# Patient Record
Sex: MALE | Race: BLACK OR AFRICAN AMERICAN | NOT HISPANIC OR LATINO | Employment: UNEMPLOYED | URBAN - METROPOLITAN AREA
[De-identification: names, ages, dates, MRNs, and addresses within clinical notes are randomized per-mention and may not be internally consistent; named-entity substitution may affect disease eponyms.]

---

## 2018-06-28 DIAGNOSIS — M24.562 CONTRACTURE OF LEFT KNEE: ICD-10-CM

## 2018-06-28 DIAGNOSIS — R53.1 WEAKNESS OF LEFT SIDE OF BODY: Primary | ICD-10-CM

## 2018-08-07 DIAGNOSIS — R53.1 WEAKNESS GENERALIZED: Primary | ICD-10-CM

## 2018-08-10 ENCOUNTER — CLINICAL SUPPORT (OUTPATIENT)
Dept: REHABILITATION | Facility: HOSPITAL | Age: 23
End: 2018-08-10
Attending: ORTHOPAEDIC SURGERY

## 2018-08-10 DIAGNOSIS — R53.1 WEAKNESS GENERALIZED: Primary | ICD-10-CM

## 2018-08-10 PROCEDURE — 97162 PT EVAL MOD COMPLEX 30 MIN: CPT | Mod: PO

## 2018-08-10 NOTE — PROGRESS NOTES
PHYSICAL THERAPY INITIAL OUTPATIENT EVALUATION    Referring Provider:  Dr. Kael Oneill    Diagnosis:       ICD-10-CM ICD-9-CM    1. Weakness generalized R53.1 780.79        Orders:  Evaluate and Treat    Date of Initial Evaluation:  8/10/18    Orders :  18    Coding Cycle Visit # 1     SUBJECTIVE:  Patient reports he had a knee replacement 5 years ago. 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 He had therapy after the surgery then switch to the [facility]. He was unable to continue therapy because of transportation. He has a vehicle now and is able to come to therapy. He feels like his leg is very weak and he is unable to exercise and feels he is getting out of shape because of it. He is also having pain in his left knee. He describes the pain as stiff and achy when sitting but when he stands he gets some muscle pain from being tired. His main goal for therapy is to get stronger so he can walk more and start to exercise again. Walking long distances, standing for long periods, and standing from sitting give him the most trouble.     No past medical history on file.    Patient Active Problem List   Diagnosis    Unequal leg length (acquired)    Muscle weakness (generalized)    Weakness of left lower extremity       No current outpatient prescriptions on file.    OBJECTIVE:  Pain: now 6/10, worst 8/10 located in his left thigh and knee, described as achy and tired.     Sensation:  intact to light touch, lateral thigh and lower leg more senstive     Knee ROM:  Flexion     R WNL L 88      Extension   R 0  L -2       Strength:  Quadriceps   R 4+/5 L 4-/5      Hamstrings   R 4+/5 L 4-/5     Gluteus Medius  R 4+/5 L 3-/5      Psoas    R 4+/5 L 4-/5     Glute Max   R 4+/5 L 3+/5          Ankle Plantarflexion  R 4+/5 L 4-/5     Ankle Dorsiflexion   B 4+/5    Muscle Length:  Hamstrings WNL          Function: LOWER EXTREMITY FUNCTIONAL SCALE                EVAL  reeval  1. Any of your  usual work, housework or school activities   1/4  2. Your usual hobbies, sporting     1/4  3. Getting in and out of tub      4/4  4. Walking between rooms      3/4  5. Putting on shoes or socks      2/4  6. Squatting        0/4  7. Lifting an object from the ground      2/4  8. Performing light activities around the home   2/4  9. Performing heavy activities around the home   1/4  10. Getting in and out of car      3/4  11. Walking 2 blocks       1/4  12.Walking a mile       0/4  13. Getting up and down 1 flight of stairs    2/4  14. Standing for 1 hour      2/4  15. Sitting for an hour       2/4  16. Running on even ground      0/4  17. Running on uneven ground     0/4  18. Making sharp turns when running fast    0/4  19. Hopping        0/4  20. Rolling over in bed       3/4    Patient reports 36% ability based on score of the Lower Extremity Functional Scale.    Tenderness to palpation:  Over anterior knee    Other:  Trendelenburg gait on the left. Slight leg length discrepancy from surgery    ASSESSMENT:  The patient is a 23 y.o. year old male who presents to physical therapy with complaints of left leg pain and weakness.  Patient's impairments include decreased muscle strength, decrease ROM, decrease balance, and increased pain.  These impairments are limiting patient's ability to walk, stand, climb stairs, exercise, and sit.  Patient's prognosis is good.  Patient will benefit from skilled physical therapy intervention to increase muscle strength, increase ROM, decrease pain, and increase balance.    Co-morbidities which may impact the plan of care and potentially impede the patient's progress in therapy include:  Chronic stage of condition, history of cancer in left leg    The patient's clinical presentation is stable.  Based on patient's stable clinical presentation, 2 co-morbidities, and examination of 1 body systems, patient presents with moderate complexity.    Short Term Goals:  (3 weeks)  1.  Patient will  demonstrate decreased pain from 6/10 to 4/10 in order to tolerate walking.  2.  Patient will report improved function indicated by a score of 45% ability on the Lower Extremity Functional Scale  3.  Patient will be independent with home exercise program.    Long Term Goals:  (6 weeks)  1.  Patient will demonstrate increase quad strength to 4+/5 in order to stand from sitting.  2.  Patient will demonstrate increased glute med strength to 4/5 in order to climb stairs.  3.  Patient will demonstrate increased glute max strength to 4/5 in order to walk to class.  4.  Patient will report improved function indicated by a score of 55% ability on the Lower Extremity Functional Scale.    TREATMENT PROVIDED:    Initial evaluation completed.    Manual Therapy:  (0 minutes)      Therapeutic Exercise:  (0 minutes)      PLAN:  Patient will benefit from physical therapy (2) x/week for (6) weeks including manual therapy, therapeutic exercise, neuromuscular re-education, functional activities, modalities, and patient education.    Thank you for this referral.    These services are reasonable and necessary for the conditions set forth above while under my care.

## 2018-08-10 NOTE — PLAN OF CARE
PHYSICAL THERAPY INITIAL OUTPATIENT EVALUATION    Referring Provider:  Dr. Kael Oneill    Diagnosis:       ICD-10-CM ICD-9-CM    1. Weakness generalized R53.1 780.79        Orders:  Evaluate and Treat    Date of Initial Evaluation:  8/10/18    Orders :  18    Coding Cycle Visit # 1     SUBJECTIVE:  Patient reports he had a knee replacement 5 years ago. 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 He had therapy after the surgery then switch to the [facility]. He was unable to continue therapy because of transportation. He has a vehicle now and is able to come to therapy. He feels like his leg is very weak and he is unable to exercise and feels he is getting out of shape because of it. He is also having pain in his left knee. He describes the pain as stiff and achy when sitting but when he stands he gets some muscle pain from being tired. His main goal for therapy is to get stronger so he can walk more and start to exercise again. Walking long distances, standing for long periods, and standing from sitting give him the most trouble.     No past medical history on file.    Patient Active Problem List   Diagnosis    Unequal leg length (acquired)    Muscle weakness (generalized)    Weakness of left lower extremity       No current outpatient prescriptions on file.    OBJECTIVE:  Pain: now 6/10, worst 8/10 located in his left thigh and knee, described as achy and tired.     Sensation:  intact to light touch, lateral thigh and lower leg more senstive     Knee ROM:  Flexion     R WNL L 88      Extension   R 0  L -2       Strength:  Quadriceps   R 4+/5 L 4-/5      Hamstrings   R 4+/5 L 4-/5     Gluteus Medius  R 4+/5 L 3-/5      Psoas    R 4+/5 L 4-/5     Glute Max   R 4+/5 L 3+/5          Ankle Plantarflexion  R 4+/5 L 4-/5     Ankle Dorsiflexion   B 4+/5    Muscle Length:  Hamstrings WNL          Function: LOWER EXTREMITY FUNCTIONAL SCALE                EVAL  reeval  1. Any of your  usual work, housework or school activities   1/4  2. Your usual hobbies, sporting     1/4  3. Getting in and out of tub      4/4  4. Walking between rooms      3/4  5. Putting on shoes or socks      2/4  6. Squatting        0/4  7. Lifting an object from the ground      2/4  8. Performing light activities around the home   2/4  9. Performing heavy activities around the home   1/4  10. Getting in and out of car      3/4  11. Walking 2 blocks       1/4  12.Walking a mile       0/4  13. Getting up and down 1 flight of stairs    2/4  14. Standing for 1 hour      2/4  15. Sitting for an hour       2/4  16. Running on even ground      0/4  17. Running on uneven ground     0/4  18. Making sharp turns when running fast    0/4  19. Hopping        0/4  20. Rolling over in bed       3/4    Patient reports 36% ability based on score of the Lower Extremity Functional Scale.    Tenderness to palpation:  Over anterior knee    Other:  Trendelenburg gait on the left. Slight leg length discrepancy from surgery    ASSESSMENT:  The patient is a 23 y.o. year old male who presents to physical therapy with complaints of left leg pain and weakness.  Patient's impairments include decreased muscle strength, decrease ROM, decrease balance, and increased pain.  These impairments are limiting patient's ability to walk, stand, climb stairs, exercise, and sit.  Patient's prognosis is good.  Patient will benefit from skilled physical therapy intervention to increase muscle strength, increase ROM, decrease pain, and increase balance.    Co-morbidities which may impact the plan of care and potentially impede the patient's progress in therapy include:  Chronic stage of condition, history of cancer in left leg    The patient's clinical presentation is stable.  Based on patient's stable clinical presentation, 2 co-morbidities, and examination of 1 body systems, patient presents with moderate complexity.    Short Term Goals:  (3 weeks)  1.  Patient will  demonstrate decreased pain from 6/10 to 4/10 in order to tolerate walking.  2.  Patient will report improved function indicated by a score of 45% ability on the Lower Extremity Functional Scale  3.  Patient will be independent with home exercise program.    Long Term Goals:  (6 weeks)  1.  Patient will demonstrate increase quad strength to 4+/5 in order to stand from sitting.  2.  Patient will demonstrate increased glute med strength to 4/5 in order to climb stairs.  3.  Patient will demonstrate increased glute max strength to 4/5 in order to walk to class.  4.  Patient will report improved function indicated by a score of 55% ability on the Lower Extremity Functional Scale.    TREATMENT PROVIDED:    Initial evaluation completed.    Manual Therapy:  (0 minutes)      Therapeutic Exercise:  (0 minutes)      PLAN:  Patient will benefit from physical therapy (2) x/week for (6) weeks including manual therapy, therapeutic exercise, neuromuscular re-education, functional activities, modalities, and patient education.    Thank you for this referral.    These services are reasonable and necessary for the conditions set forth above while under my care.

## 2018-08-17 ENCOUNTER — CLINICAL SUPPORT (OUTPATIENT)
Dept: REHABILITATION | Facility: HOSPITAL | Age: 23
End: 2018-08-17
Attending: ORTHOPAEDIC SURGERY

## 2018-08-17 DIAGNOSIS — R53.1 WEAKNESS GENERALIZED: Primary | ICD-10-CM

## 2018-08-17 PROCEDURE — 97014 ELECTRIC STIMULATION THERAPY: CPT | Mod: PO

## 2018-08-17 PROCEDURE — 97112 NEUROMUSCULAR REEDUCATION: CPT | Mod: PO

## 2018-08-17 PROCEDURE — 97140 MANUAL THERAPY 1/> REGIONS: CPT | Mod: PO

## 2018-08-17 PROCEDURE — 97110 THERAPEUTIC EXERCISES: CPT | Mod: PO

## 2018-08-17 NOTE — PROGRESS NOTES
PHYSICAL THERAPY OUTPATIENT TREATMENT    Referring Provider:  Dr. Kael Oneill    Diagnosis:       ICD-10-CM ICD-9-CM    1. Weakness generalized R53.1 780.79        Orders:  Evaluate and Treat    Date of Initial Evaluation:  8/10/18    Orders :  18    Coding Cycle Visit # 2    SUBJECTIVE:  Patient reports he is hurting today.     Initial: Patient reports he had a knee replacement 5 years ago. 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 He had therapy after the surgery then switch to the [facility]. He was unable to continue therapy because of transportation. He has a vehicle now and is able to come to therapy. He feels like his leg is very weak and he is unable to exercise and feels he is getting out of shape because of it. He is also having pain in his left knee. He describes the pain as stiff and achy when sitting but when he stands he gets some muscle pain from being tired. His main goal for therapy is to get stronger so he can walk more and start to exercise again. Walking long distances, standing for long periods, and standing from sitting give him the most trouble.     No past medical history on file.    Patient Active Problem List   Diagnosis    Unequal leg length (acquired)    Muscle weakness (generalized)    Weakness of left lower extremity       No current outpatient medications on file.    OBJECTIVE:  Pain: now 6/10, worst 8/10 located in his left thigh and knee, described as achy and tired.     Sensation:  intact to light touch, lateral thigh and lower leg more senstive     Knee ROM:  Flexion     R WNL L 88      Extension   R 0  L -2    Patella Mobility: Hypomobile in all directions       Strength:  Quadriceps   R 4+/5 L 4-/5      Hamstrings   R 4+/5 L 4-/5     Gluteus Medius  R 4+/5 L 3-/5      Psoas    R 4+/5 L 4-/5     Glute Max   R 4+/5 L 3+/5          Ankle Plantarflexion  R 4+/5 L 4-/5     Ankle Dorsiflexion   B 4+/5    Muscle Length:  Hamstrings  WNL          Function: LOWER EXTREMITY FUNCTIONAL SCALE                EVAL  reeval  1. Any of your usual work, housework or school activities   1/4  2. Your usual hobbies, sporting     1/4  3. Getting in and out of tub      4/4  4. Walking between rooms      3/4  5. Putting on shoes or socks      2/4  6. Squatting        0/4  7. Lifting an object from the ground      2/4  8. Performing light activities around the home   2/4  9. Performing heavy activities around the home   1/4  10. Getting in and out of car      3/4  11. Walking 2 blocks       1/4  12.Walking a mile       0/4  13. Getting up and down 1 flight of stairs    2/4  14. Standing for 1 hour      2/4  15. Sitting for an hour       2/4  16. Running on even ground      0/4  17. Running on uneven ground     0/4  18. Making sharp turns when running fast    0/4  19. Hopping        0/4  20. Rolling over in bed       3/4    Patient reports 36% ability based on score of the Lower Extremity Functional Scale.    Tenderness to palpation:  Over anterior knee    Other:  Trendelenburg gait on the left. Slight leg length discrepancy from surgery    ASSESSMENT:  Patient tolerated today's treatment well. Patient had increase pain in anterior knee with mini squats and LAQ. LAQ pain decreased with manual superior glide to patella. Neuro re-ed was performed to vastus lateralis to help with contraction for quad sets. Patient will get MRI and x-ray results to view.     Initial: The patient is a 23 y.o. year old male who presents to physical therapy with complaints of left leg pain and weakness.  Patient's impairments include decreased muscle strength, decrease ROM, decrease balance, and increased pain.  These impairments are limiting patient's ability to walk, stand, climb stairs, exercise, and sit.  Patient's prognosis is good.  Patient will benefit from skilled physical therapy intervention to increase muscle strength, increase ROM, decrease pain, and increase  balance.    Co-morbidities which may impact the plan of care and potentially impede the patient's progress in therapy include:  Chronic stage of condition, history of cancer in left leg    The patient's clinical presentation is stable.  Based on patient's stable clinical presentation, 2 co-morbidities, and examination of 1 body systems, patient presents with moderate complexity.    Short Term Goals:  (3 weeks)  1.  Patient will demonstrate decreased pain from 6/10 to 4/10 in order to tolerate walking.  2.  Patient will report improved function indicated by a score of 45% ability on the Lower Extremity Functional Scale  3.  Patient will be independent with home exercise program.    Long Term Goals:  (6 weeks)  1.  Patient will demonstrate increase quad strength to 4+/5 in order to stand from sitting.  2.  Patient will demonstrate increased glute med strength to 4/5 in order to climb stairs.  3.  Patient will demonstrate increased glute max strength to 4/5 in order to walk to class.  4.  Patient will report improved function indicated by a score of 55% ability on the Lower Extremity Functional Scale.    TREATMENT PROVIDED:    Initial evaluation completed.    Manual Therapy:  (8 minutes)      Patella Glides in all directions    Therapeutic Exercise:  (35 minutes)      Quad Set    LAQ x20    Seated March x30    Hip Add x30    Hip Abd x30    Side lying Clam x30    Mini Squats x30    Gait     Modalities:  Neuro Re-ed for 8 minutes to vastus lateralis. Settings at Width 200, Rate 50, 1 sec on:20 secs off. Patient tolerated well with no skin irritation or adverse reaction.     Heat and IFC to left knee for 15 minutes. Patient tolerated well with no skin irritation or adverse reaction.     PLAN:  Patient will benefit from physical therapy (2) x/week for (6) weeks including manual therapy, therapeutic exercise, neuromuscular re-education, functional activities, modalities, and patient education.    Thank you for this  referral.    These services are reasonable and necessary for the conditions set forth above while under my care.

## 2018-08-20 ENCOUNTER — CLINICAL SUPPORT (OUTPATIENT)
Dept: REHABILITATION | Facility: HOSPITAL | Age: 23
End: 2018-08-20
Attending: ORTHOPAEDIC SURGERY

## 2018-08-20 DIAGNOSIS — R53.1 WEAKNESS GENERALIZED: Primary | ICD-10-CM

## 2018-08-20 PROCEDURE — 97014 ELECTRIC STIMULATION THERAPY: CPT | Mod: PO

## 2018-08-20 PROCEDURE — 97140 MANUAL THERAPY 1/> REGIONS: CPT | Mod: PO

## 2018-08-20 PROCEDURE — 97112 NEUROMUSCULAR REEDUCATION: CPT | Mod: PO

## 2018-08-20 PROCEDURE — 97110 THERAPEUTIC EXERCISES: CPT | Mod: PO

## 2018-08-20 NOTE — PROGRESS NOTES
PHYSICAL THERAPY OUTPATIENT TREATMENT    Referring Provider:  Dr. Kael Oneill    Diagnosis:       ICD-10-CM ICD-9-CM    1. Weakness generalized R53.1 780.79        Orders:  Evaluate and Treat    Date of Initial Evaluation:  8/10/18    Orders :  18    Coding Cycle Visit # 3    SUBJECTIVE:  Patient reports he was sore after last treatment. He is feeling a little more pain today because he has been walking around campus all morning.     Initial: Patient reports he had a knee replacement 5 years ago. 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 He had therapy after the surgery then switch to the [facility]. He was unable to continue therapy because of transportation. He has a vehicle now and is able to come to therapy. He feels like his leg is very weak and he is unable to exercise and feels he is getting out of shape because of it. He is also having pain in his left knee. He describes the pain as stiff and achy when sitting but when he stands he gets some muscle pain from being tired. His main goal for therapy is to get stronger so he can walk more and start to exercise again. Walking long distances, standing for long periods, and standing from sitting give him the most trouble.     No past medical history on file.    Patient Active Problem List   Diagnosis    Unequal leg length (acquired)    Muscle weakness (generalized)    Weakness of left lower extremity       No current outpatient medications on file.    OBJECTIVE:  Pain: now 6/10, worst 8/10 located in his left thigh and knee, described as achy and tired.     Sensation:  intact to light touch, lateral thigh and lower leg more senstive     Knee ROM:  Flexion     R WNL L 88      Extension   R 0  L -2    Patella Mobility: Hypomobile in all directions       Strength:  Quadriceps   R 4+/5 L 4-/5      Hamstrings   R 4+/5 L 4-/5     Gluteus Medius  R 4+/5 L 3-/5      Psoas    R 4+/5 L 4-/5     Glute Max   R 4+/5 L  3+/5          Ankle Plantarflexion  R 4+/5 L 4-/5     Ankle Dorsiflexion   B 4+/5    Muscle Length:  Hamstrings WNL          Function: LOWER EXTREMITY FUNCTIONAL SCALE                EVAL  reeval  1. Any of your usual work, housework or school activities   1/4  2. Your usual hobbies, sporting     1/4  3. Getting in and out of tub      4/4  4. Walking between rooms      3/4  5. Putting on shoes or socks      2/4  6. Squatting        0/4  7. Lifting an object from the ground      2/4  8. Performing light activities around the home   2/4  9. Performing heavy activities around the home   1/4  10. Getting in and out of car      3/4  11. Walking 2 blocks       1/4  12.Walking a mile       0/4  13. Getting up and down 1 flight of stairs    2/4  14. Standing for 1 hour      2/4  15. Sitting for an hour       2/4  16. Running on even ground      0/4  17. Running on uneven ground     0/4  18. Making sharp turns when running fast    0/4  19. Hopping        0/4  20. Rolling over in bed       3/4    Patient reports 36% ability based on score of the Lower Extremity Functional Scale.    Tenderness to palpation:  Over anterior knee    Other:  Trendelenburg gait on the left. Slight leg length discrepancy from surgery    ASSESSMENT:  Patient tolerated all exercises well. Standing march on foam pad was added to increase single leg stance time and strengthen hip flexors for gait.     Initial: The patient is a 23 y.o. year old male who presents to physical therapy with complaints of left leg pain and weakness.  Patient's impairments include decreased muscle strength, decrease ROM, decrease balance, and increased pain.  These impairments are limiting patient's ability to walk, stand, climb stairs, exercise, and sit.  Patient's prognosis is good.  Patient will benefit from skilled physical therapy intervention to increase muscle strength, increase ROM, decrease pain, and increase balance.    Co-morbidities which may impact the plan of  care and potentially impede the patient's progress in therapy include:  Chronic stage of condition, history of cancer in left leg    The patient's clinical presentation is stable.  Based on patient's stable clinical presentation, 2 co-morbidities, and examination of 1 body systems, patient presents with moderate complexity.    Short Term Goals:  (3 weeks)  1.  Patient will demonstrate decreased pain from 6/10 to 4/10 in order to tolerate walking.  2.  Patient will report improved function indicated by a score of 45% ability on the Lower Extremity Functional Scale  3.  Patient will be independent with home exercise program.    Long Term Goals:  (6 weeks)  1.  Patient will demonstrate increase quad strength to 4+/5 in order to stand from sitting.  2.  Patient will demonstrate increased glute med strength to 4/5 in order to climb stairs.  3.  Patient will demonstrate increased glute max strength to 4/5 in order to walk to class.  4.  Patient will report improved function indicated by a score of 55% ability on the Lower Extremity Functional Scale.    TREATMENT PROVIDED:    Initial evaluation completed.    Manual Therapy:  (8 minutes)      Patella Glides in all directions    Soft tissue around patella    Therapeutic Exercise:  (34 minutes)      Quad Set    LAQ x20    Seated March x30    Hip Add x30    Hip Abd x30    Side lying Clam x30    Mini Squats x30    Gait     Standing March x30    Modalities:  Neuro Re-ed for 8 minutes to vastus lateralis. Settings at Width 200, Rate 50, 1 sec on:20 secs off. Patient tolerated well with no skin irritation or adverse reaction.     Heat and IFC to left knee for 15 minutes. Patient tolerated well with no skin irritation or adverse reaction.     PLAN:  Patient will benefit from physical therapy (2) x/week for (6) weeks including manual therapy, therapeutic exercise, neuromuscular re-education, functional activities, modalities, and patient education.    Thank you for this  referral.    These services are reasonable and necessary for the conditions set forth above while under my care.

## 2018-08-22 ENCOUNTER — CLINICAL SUPPORT (OUTPATIENT)
Dept: REHABILITATION | Facility: HOSPITAL | Age: 23
End: 2018-08-22
Attending: ORTHOPAEDIC SURGERY

## 2018-08-22 DIAGNOSIS — R53.1 WEAKNESS GENERALIZED: Primary | ICD-10-CM

## 2018-08-22 PROCEDURE — 97014 ELECTRIC STIMULATION THERAPY: CPT | Mod: PO

## 2018-08-22 PROCEDURE — 97140 MANUAL THERAPY 1/> REGIONS: CPT | Mod: PO

## 2018-08-22 PROCEDURE — 97110 THERAPEUTIC EXERCISES: CPT | Mod: PO

## 2018-08-22 NOTE — PROGRESS NOTES
PHYSICAL THERAPY OUTPATIENT TREATMENT    Referring Provider:  Dr. Kael Oneill    Diagnosis:       ICD-10-CM ICD-9-CM    1. Weakness generalized R53.1 780.79        Orders:  Evaluate and Treat    Date of Initial Evaluation:  8/10/18    Orders :  18    Coding Cycle Visit # 4    SUBJECTIVE:  Patient reports he feels like he is getting a little stronger and he isn't having as much pain in his knee. He has to go up a flight of stairs this morning but it wasn't to bad.     Initial: Patient reports he had a knee replacement 5 years ago. 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 He had therapy after the surgery then switch to the [facility]. He was unable to continue therapy because of transportation. He has a vehicle now and is able to come to therapy. He feels like his leg is very weak and he is unable to exercise and feels he is getting out of shape because of it. He is also having pain in his left knee. He describes the pain as stiff and achy when sitting but when he stands he gets some muscle pain from being tired. His main goal for therapy is to get stronger so he can walk more and start to exercise again. Walking long distances, standing for long periods, and standing from sitting give him the most trouble.     No past medical history on file.    Patient Active Problem List   Diagnosis    Unequal leg length (acquired)    Muscle weakness (generalized)    Weakness of left lower extremity       No current outpatient medications on file.    OBJECTIVE:  Pain: now 6/10, worst 8/10 located in his left thigh and knee, described as achy and tired.     Sensation:  intact to light touch, lateral thigh and lower leg more senstive     Knee ROM:  Flexion     R WNL L 88      Extension   R 0  L -2    Patella Mobility: Hypomobile in all directions       Strength:  Quadriceps   R 4+/5 L 4-/5      Hamstrings   R 4+/5 L 4-/5     Gluteus Medius  R 4+/5 L 3-/5      Psoas    R 4+/5 L  4-/5     Glute Max   R 4+/5 L 3+/5          Ankle Plantarflexion  R 4+/5 L 4-/5     Ankle Dorsiflexion   B 4+/5    Muscle Length:  Hamstrings WNL          Function: LOWER EXTREMITY FUNCTIONAL SCALE                EVAL  reeval  1. Any of your usual work, housework or school activities   1/4  2. Your usual hobbies, sporting     1/4  3. Getting in and out of tub      4/4  4. Walking between rooms      3/4  5. Putting on shoes or socks      2/4  6. Squatting        0/4  7. Lifting an object from the ground      2/4  8. Performing light activities around the home   2/4  9. Performing heavy activities around the home   1/4  10. Getting in and out of car      3/4  11. Walking 2 blocks       1/4  12.Walking a mile       0/4  13. Getting up and down 1 flight of stairs    2/4  14. Standing for 1 hour      2/4  15. Sitting for an hour       2/4  16. Running on even ground      0/4  17. Running on uneven ground     0/4  18. Making sharp turns when running fast    0/4  19. Hopping        0/4  20. Rolling over in bed       3/4    Patient reports 36% ability based on score of the Lower Extremity Functional Scale.    Tenderness to palpation:  Over anterior knee    Other:  Trendelenburg gait on the left. Slight leg length discrepancy from surgery    ASSESSMENT:  Marching on the foam increased some hip pain and was discontinued. Patient provided a home exercise program to perform exercises and stretches at home. Patient tolerated today's treatment well. Shuttle was added and tolerated well. Neuro-reeducation was deferred today.     Initial: The patient is a 23 y.o. year old male who presents to physical therapy with complaints of left leg pain and weakness.  Patient's impairments include decreased muscle strength, decrease ROM, decrease balance, and increased pain.  These impairments are limiting patient's ability to walk, stand, climb stairs, exercise, and sit.  Patient's prognosis is good.  Patient will benefit from skilled  physical therapy intervention to increase muscle strength, increase ROM, decrease pain, and increase balance.    Co-morbidities which may impact the plan of care and potentially impede the patient's progress in therapy include:  Chronic stage of condition, history of cancer in left leg    The patient's clinical presentation is stable.  Based on patient's stable clinical presentation, 2 co-morbidities, and examination of 1 body systems, patient presents with moderate complexity.    Short Term Goals:  (3 weeks)  1.  Patient will demonstrate decreased pain from 6/10 to 4/10 in order to tolerate walking.  2.  Patient will report improved function indicated by a score of 45% ability on the Lower Extremity Functional Scale  3.  Patient will be independent with home exercise program.    Long Term Goals:  (6 weeks)  1.  Patient will demonstrate increase quad strength to 4+/5 in order to stand from sitting.  2.  Patient will demonstrate increased glute med strength to 4/5 in order to climb stairs.  3.  Patient will demonstrate increased glute max strength to 4/5 in order to walk to class.  4.  Patient will report improved function indicated by a score of 55% ability on the Lower Extremity Functional Scale.    TREATMENT PROVIDED:    Initial evaluation completed.    Manual Therapy:  (8 minutes)      Patella Glides in all directions    Soft tissue around patella    Therapeutic Exercise:  (35 minutes)      Quad Set    LAQ x20    Seated March x30    Hip Add x30    Hip Abd x30    Side lying Clam x30    Mini Squats x30    Gait     Standing March x30    Shuttle x30    Single Leg Shuttle x30    Modalities:  Neuro Re-ed for 8 minutes to vastus lateralis. Settings at Width 200, Rate 50, 1 sec on:20 secs off. Patient tolerated well with no skin irritation or adverse reaction. -deferred    Heat and IFC to left knee for 15 minutes. Patient tolerated well with no skin irritation or adverse reaction.     Patient provided a home exercise  program consisting of: LAQ, Quad set, seated march, hip add, hip abd, and sidelying clam.     PLAN:  Patient will benefit from physical therapy (2) x/week for (6) weeks including manual therapy, therapeutic exercise, neuromuscular re-education, functional activities, modalities, and patient education.    Thank you for this referral.    These services are reasonable and necessary for the conditions set forth above while under my care.

## 2018-08-31 ENCOUNTER — CLINICAL SUPPORT (OUTPATIENT)
Dept: REHABILITATION | Facility: HOSPITAL | Age: 23
End: 2018-08-31
Attending: ORTHOPAEDIC SURGERY

## 2018-08-31 DIAGNOSIS — R53.1 WEAKNESS GENERALIZED: Primary | ICD-10-CM

## 2018-08-31 PROCEDURE — 97110 THERAPEUTIC EXERCISES: CPT | Mod: PO

## 2018-08-31 PROCEDURE — 97014 ELECTRIC STIMULATION THERAPY: CPT | Mod: PO

## 2018-08-31 NOTE — PROGRESS NOTES
PHYSICAL THERAPY OUTPATIENT TREATMENT    Referring Provider:  Dr. Kael Oneill    Diagnosis:       ICD-10-CM ICD-9-CM    1. Weakness generalized R53.1 780.79        Orders:  Evaluate and Treat    Date of Initial Evaluation:  8/10/18    Orders :  18    Coding Cycle Visit # 5    SUBJECTIVE:  Patient reports he was sore after last treatment but his knee is feeling better.     Initial: Patient reports he had a knee replacement 5 years ago. 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 He had therapy after the surgery then switch to the [facility]. He was unable to continue therapy because of transportation. He has a vehicle now and is able to come to therapy. He feels like his leg is very weak and he is unable to exercise and feels he is getting out of shape because of it. He is also having pain in his left knee. He describes the pain as stiff and achy when sitting but when he stands he gets some muscle pain from being tired. His main goal for therapy is to get stronger so he can walk more and start to exercise again. Walking long distances, standing for long periods, and standing from sitting give him the most trouble.     No past medical history on file.    Patient Active Problem List   Diagnosis    Unequal leg length (acquired)    Muscle weakness (generalized)    Weakness of left lower extremity       No current outpatient medications on file.    OBJECTIVE:  Pain: now 6/10, worst 8/10 located in his left thigh and knee, described as achy and tired.     Sensation:  intact to light touch, lateral thigh and lower leg more senstive     Knee ROM:  Flexion     R WNL L 88      Extension   R 0  L -2    Patella Mobility: Hypomobile in all directions       Strength:  Quadriceps   R 4+/5 L 4-/5      Hamstrings   R 4+/5 L 4-/5     Gluteus Medius  R 4+/5 L 3-/5      Psoas    R 4+/5 L 4-/5     Glute Max   R 4+/5 L 3+/5          Ankle Plantarflexion  R 4+/5 L 4-/5     Ankle Dorsiflexion   B  4+/5    Muscle Length:  Hamstrings WNL          Function: LOWER EXTREMITY FUNCTIONAL SCALE                EVAL  reeval  1. Any of your usual work, housework or school activities   1/4  2. Your usual hobbies, sporting     1/4  3. Getting in and out of tub      4/4  4. Walking between rooms      3/4  5. Putting on shoes or socks      2/4  6. Squatting        0/4  7. Lifting an object from the ground      2/4  8. Performing light activities around the home   2/4  9. Performing heavy activities around the home   1/4  10. Getting in and out of car      3/4  11. Walking 2 blocks       1/4  12.Walking a mile       0/4  13. Getting up and down 1 flight of stairs    2/4  14. Standing for 1 hour      2/4  15. Sitting for an hour       2/4  16. Running on even ground      0/4  17. Running on uneven ground     0/4  18. Making sharp turns when running fast    0/4  19. Hopping        0/4  20. Rolling over in bed       3/4    Patient reports 36% ability based on score of the Lower Extremity Functional Scale.    Tenderness to palpation:  Over anterior knee    Other:  Trendelenburg gait on the left. Slight leg length discrepancy from surgery    ASSESSMENT:  Bike was added to increase endurance and help ROM for L knee. Neuro re-ed was deferred due to good muscle activation. Patient has decreased sharp pain in anterior knee. TKE was added and tolerated well. Shuttle and mini squats are becoming easier and less painful. Manual was deferred today due to time. Patient tolerated today's treatment well.     Initial: The patient is a 23 y.o. year old male who presents to physical therapy with complaints of left leg pain and weakness.  Patient's impairments include decreased muscle strength, decrease ROM, decrease balance, and increased pain.  These impairments are limiting patient's ability to walk, stand, climb stairs, exercise, and sit.  Patient's prognosis is good.  Patient will benefit from skilled physical therapy intervention to  increase muscle strength, increase ROM, decrease pain, and increase balance.    Co-morbidities which may impact the plan of care and potentially impede the patient's progress in therapy include:  Chronic stage of condition, history of cancer in left leg    The patient's clinical presentation is stable.  Based on patient's stable clinical presentation, 2 co-morbidities, and examination of 1 body systems, patient presents with moderate complexity.    Short Term Goals:  (3 weeks)  1.  Patient will demonstrate decreased pain from 6/10 to 4/10 in order to tolerate walking.  2.  Patient will report improved function indicated by a score of 45% ability on the Lower Extremity Functional Scale  3.  Patient will be independent with home exercise program.    Long Term Goals:  (6 weeks)  1.  Patient will demonstrate increase quad strength to 4+/5 in order to stand from sitting.  2.  Patient will demonstrate increased glute med strength to 4/5 in order to climb stairs.  3.  Patient will demonstrate increased glute max strength to 4/5 in order to walk to class.  4.  Patient will report improved function indicated by a score of 55% ability on the Lower Extremity Functional Scale.    TREATMENT PROVIDED:    Initial evaluation completed.    Manual Therapy:  (0 minutes)  -deferred    Patella Glides in all directions    Soft tissue around patella    Therapeutic Exercise:  (42 minutes)      Quad Set    LAQ x30    Seated March x30    Hip Add x30    Hip Abd x30    Side lying Clam x30    Mini Squats x30    Gait     Standing March x30    Shuttle x30    Single Leg Shuttle x30    Bike 5 minutes    TKE x30    Modalities:  Neuro Re-ed for 8 minutes to vastus lateralis. Settings at Width 200, Rate 50, 1 sec on:20 secs off. Patient tolerated well with no skin irritation or adverse reaction. -deferred    Heat and IFC to left knee for 15 minutes. Patient tolerated well with no skin irritation or adverse reaction.     Patient provided a home  exercise program consisting of: LAQ, Quad set, seated march, hip add, hip abd, and sidelying clam.     PLAN:  Patient will benefit from physical therapy (2) x/week for (6) weeks including manual therapy, therapeutic exercise, neuromuscular re-education, functional activities, modalities, and patient education.    Thank you for this referral.    These services are reasonable and necessary for the conditions set forth above while under my care.

## 2018-10-03 ENCOUNTER — CLINICAL SUPPORT (OUTPATIENT)
Dept: REHABILITATION | Facility: HOSPITAL | Age: 23
End: 2018-10-03
Attending: ORTHOPAEDIC SURGERY
Payer: MEDICAID

## 2018-10-03 DIAGNOSIS — R53.1 WEAKNESS GENERALIZED: Primary | ICD-10-CM

## 2018-10-03 PROCEDURE — 97014 ELECTRIC STIMULATION THERAPY: CPT | Mod: PO

## 2018-10-03 PROCEDURE — 97110 THERAPEUTIC EXERCISES: CPT | Mod: PO

## 2018-10-03 NOTE — PLAN OF CARE
PHYSICAL THERAPY OUTPATIENT TREATMENT PROGRESS NOTE    Referring Provider:  Dr. Kael Oneill    Diagnosis:       ICD-10-CM ICD-9-CM    1. Weakness generalized R53.1 780.79        Orders:  Evaluate and Treat    Date of Initial Evaluation:  8/10/18    Orders :  18    Coding Cycle Visit # 6    SUBJECTIVE:  Patient reports that he had to take a while off from therapy because of insurance. He feels that over the past month he has regressed quite a bit. He states he has still been doing most of his exercises at home. He feels he is stronger in some ways but walking has still been the biggest issue.     Initial: Patient reports he had a knee replacement 5 years ago. 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 He had therapy after the surgery then switch to the [facility]. He was unable to continue therapy because of transportation. He has a vehicle now and is able to come to therapy. He feels like his leg is very weak and he is unable to exercise and feels he is getting out of shape because of it. He is also having pain in his left knee. He describes the pain as stiff and achy when sitting but when he stands he gets some muscle pain from being tired. His main goal for therapy is to get stronger so he can walk more and start to exercise again. Walking long distances, standing for long periods, and standing from sitting give him the most trouble.     No past medical history on file.    Patient Active Problem List   Diagnosis    Unequal leg length (acquired)    Muscle weakness (generalized)    Weakness of left lower extremity       No current outpatient medications on file.    OBJECTIVE:  Pain: now 6/10, worst 8/10 located in his left thigh and knee, described as achy and tired.     Sensation:  intact to light touch, lateral thigh and lower leg more senstive     Knee ROM:  Flexion     R WNL L 90      Extension   R 0  L -2    Patella Mobility: Hypomobile in all  directions       Strength:  Quadriceps   R 4+/5 L 4-/5      Hamstrings   R 4+/5 L 4-/5     Gluteus Medius  R 4+/5 L 3-/5      Psoas    R 4+/5 L 4-/5     Glute Max   R 4+/5 L 3+/5          Ankle Plantarflexion  R 4+/5 L 4-/5     Ankle Dorsiflexion   B 4+/5    Muscle Length:  Hamstrings WNL          Function: LOWER EXTREMITY FUNCTIONAL SCALE                EVAL  reeval  1. Any of your usual work, housework or school activities   1/4 2/4  2. Your usual hobbies, sporting     1/4 0/4  3. Getting in and out of tub      4/4  3/4  4. Walking between rooms      3/4  3/4  5. Putting on shoes or socks      2/4 2/4  6. Squatting        0/4 1/4  7. Lifting an object from the ground      2/4 2/4  8. Performing light activities around the home   2/4  3/4  9. Performing heavy activities around the home   1/4 1/4  10. Getting in and out of car      3/4  3/4  11. Walking 2 blocks       1/4 2/4  12.Walking a mile       0/4 1/4  13. Getting up and down 1 flight of stairs    2/4 2/4  14. Standing for 1 hour      2/4 2/4  15. Sitting for an hour       2/4 2/4  16. Running on even ground      0/4 0/4  17. Running on uneven ground     0/4 0/4  18. Making sharp turns when running fast    0/4 0/4  19. Hopping        0/4 1/4  20. Rolling over in bed       3/4  4/4    Patient reports 43% (Initial: 36%) ability based on score of the Lower Extremity Functional Scale.    Tenderness to palpation:  Over anterior knee    Other:  Trendelenburg gait on the left. Slight leg length discrepancy from surgery    ASSESSMENT:  Patient strength is getting better. Patient report increased from 36% to 43% ability on the LEFS.     Initial: The patient is a 23 y.o. year old male who presents to physical therapy with complaints of left leg pain and weakness.  Patient's impairments include decreased muscle strength, decrease ROM, decrease balance, and increased pain.  These impairments are limiting patient's ability to walk, stand, climb stairs,  exercise, and sit.  Patient's prognosis is good.  Patient will benefit from skilled physical therapy intervention to increase muscle strength, increase ROM, decrease pain, and increase balance.    Co-morbidities which may impact the plan of care and potentially impede the patient's progress in therapy include:  Chronic stage of condition, history of cancer in left leg    The patient's clinical presentation is stable.  Based on patient's stable clinical presentation, 2 co-morbidities, and examination of 1 body systems, patient presents with moderate complexity.    Short Term Goals:  (3 weeks)  1.  Patient will demonstrate decreased pain from 6/10 to 4/10 in order to tolerate walking.  2.  Patient will report improved function indicated by a score of 45% ability on the Lower Extremity Functional Scale  3.  Patient will be independent with home exercise program.    Long Term Goals:  (6 weeks)  1.  Patient will demonstrate increase quad strength to 4+/5 in order to stand from sitting.  2.  Patient will demonstrate increased glute med strength to 4/5 in order to climb stairs.  3.  Patient will demonstrate increased glute max strength to 4/5 in order to walk to class.  4.  Patient will report improved function indicated by a score of 55% ability on the Lower Extremity Functional Scale.    TREATMENT PROVIDED:    Initial evaluation completed.    Manual Therapy:  (0 minutes)  -deferred    Patella Glides in all directions    Soft tissue around patella    Therapeutic Exercise:  (46 minutes)      Quad Set -HEP    LAQ x30    Seated March x30    Hip Add x30    Hip Abd x30    Side lying Clam x30    Mini Squats x30    Gait     Standing March x30 -HEP    Shuttle x30    Single Leg Shuttle x30    Bike 5 minutes    TKE x30 -HEP    Straight Leg Raise x30    Step Ups x30    Modalities:  Neuro Re-ed for 8 minutes to vastus lateralis. Settings at Width 200, Rate 50, 1 sec on:20 secs off. Patient tolerated well with no skin irritation or  adverse reaction. -deferred    Heat and IFC to left knee for 15 minutes. Patient tolerated well with no skin irritation or adverse reaction.     Patient provided a home exercise program consisting of: LAQ, Quad set, seated march, hip add, hip abd, and sidelying clam.     PLAN:  Patient will benefit from physical therapy (2) x/week for (6) weeks including manual therapy, therapeutic exercise, neuromuscular re-education, functional activities, modalities, and patient education.    Thank you for this referral.    These services are reasonable and necessary for the conditions set forth above while under my care.

## 2018-10-03 NOTE — PROGRESS NOTES
PHYSICAL THERAPY OUTPATIENT TREATMENT PROGRESS NOTE    Referring Provider:  Dr. Kael Oneill    Diagnosis:       ICD-10-CM ICD-9-CM    1. Weakness generalized R53.1 780.79        Orders:  Evaluate and Treat    Date of Initial Evaluation:  8/10/18    Orders :  18    Coding Cycle Visit # 6    SUBJECTIVE:  Patient reports that he had to take a while off from therapy because of insurance. He feels that over the past month he has regressed quite a bit. He states he has still been doing most of his exercises at home. He feels he is stronger in some ways but walking has still been the biggest issue.     Initial: Patient reports he had a knee replacement 5 years ago. 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 He had therapy after the surgery then switch to the [facility]. He was unable to continue therapy because of transportation. He has a vehicle now and is able to come to therapy. He feels like his leg is very weak and he is unable to exercise and feels he is getting out of shape because of it. He is also having pain in his left knee. He describes the pain as stiff and achy when sitting but when he stands he gets some muscle pain from being tired. His main goal for therapy is to get stronger so he can walk more and start to exercise again. Walking long distances, standing for long periods, and standing from sitting give him the most trouble.     No past medical history on file.    Patient Active Problem List   Diagnosis    Unequal leg length (acquired)    Muscle weakness (generalized)    Weakness of left lower extremity       No current outpatient medications on file.    OBJECTIVE:  Pain: now 6/10, worst 8/10 located in his left thigh and knee, described as achy and tired.     Sensation:  intact to light touch, lateral thigh and lower leg more senstive     Knee ROM:  Flexion     R WNL L 90      Extension   R 0  L -2    Patella Mobility: Hypomobile in all  directions       Strength:  Quadriceps   R 4+/5 L 4-/5      Hamstrings   R 4+/5 L 4-/5     Gluteus Medius  R 4+/5 L 3-/5      Psoas    R 4+/5 L 4-/5     Glute Max   R 4+/5 L 3+/5          Ankle Plantarflexion  R 4+/5 L 4-/5     Ankle Dorsiflexion   B 4+/5    Muscle Length:  Hamstrings WNL          Function: LOWER EXTREMITY FUNCTIONAL SCALE                EVAL  reeval  1. Any of your usual work, housework or school activities   1/4 2/4  2. Your usual hobbies, sporting     1/4 0/4  3. Getting in and out of tub      4/4  3/4  4. Walking between rooms      3/4  3/4  5. Putting on shoes or socks      2/4 2/4  6. Squatting        0/4 1/4  7. Lifting an object from the ground      2/4 2/4  8. Performing light activities around the home   2/4  3/4  9. Performing heavy activities around the home   1/4 1/4  10. Getting in and out of car      3/4  3/4  11. Walking 2 blocks       1/4 2/4  12.Walking a mile       0/4 1/4  13. Getting up and down 1 flight of stairs    2/4 2/4  14. Standing for 1 hour      2/4 2/4  15. Sitting for an hour       2/4 2/4  16. Running on even ground      0/4 0/4  17. Running on uneven ground     0/4 0/4  18. Making sharp turns when running fast    0/4 0/4  19. Hopping        0/4 1/4  20. Rolling over in bed       3/4  4/4    Patient reports 43% (Initial: 36%) ability based on score of the Lower Extremity Functional Scale.    Tenderness to palpation:  Over anterior knee    Other:  Trendelenburg gait on the left. Slight leg length discrepancy from surgery    ASSESSMENT:  Patient strength is getting better. Patient report increased from 36% to 43% ability on the LEFS.     Initial: The patient is a 23 y.o. year old male who presents to physical therapy with complaints of left leg pain and weakness.  Patient's impairments include decreased muscle strength, decrease ROM, decrease balance, and increased pain.  These impairments are limiting patient's ability to walk, stand, climb stairs,  exercise, and sit.  Patient's prognosis is good.  Patient will benefit from skilled physical therapy intervention to increase muscle strength, increase ROM, decrease pain, and increase balance.    Co-morbidities which may impact the plan of care and potentially impede the patient's progress in therapy include:  Chronic stage of condition, history of cancer in left leg    The patient's clinical presentation is stable.  Based on patient's stable clinical presentation, 2 co-morbidities, and examination of 1 body systems, patient presents with moderate complexity.    Short Term Goals:  (3 weeks)  1.  Patient will demonstrate decreased pain from 6/10 to 4/10 in order to tolerate walking.  2.  Patient will report improved function indicated by a score of 45% ability on the Lower Extremity Functional Scale  3.  Patient will be independent with home exercise program.    Long Term Goals:  (6 weeks)  1.  Patient will demonstrate increase quad strength to 4+/5 in order to stand from sitting.  2.  Patient will demonstrate increased glute med strength to 4/5 in order to climb stairs.  3.  Patient will demonstrate increased glute max strength to 4/5 in order to walk to class.  4.  Patient will report improved function indicated by a score of 55% ability on the Lower Extremity Functional Scale.    TREATMENT PROVIDED:    Initial evaluation completed.    Manual Therapy:  (0 minutes)  -deferred    Patella Glides in all directions    Soft tissue around patella    Therapeutic Exercise:  (46 minutes)      Quad Set -HEP    LAQ x30    Seated March x30    Hip Add x30    Hip Abd x30    Side lying Clam x30    Mini Squats x30    Gait     Standing March x30 -HEP    Shuttle x30    Single Leg Shuttle x30    Bike 5 minutes    TKE x30 -HEP    Straight Leg Raise x30    Step Ups x30    Modalities:  Neuro Re-ed for 8 minutes to vastus lateralis. Settings at Width 200, Rate 50, 1 sec on:20 secs off. Patient tolerated well with no skin irritation or  adverse reaction. -deferred    Heat and IFC to left knee for 15 minutes. Patient tolerated well with no skin irritation or adverse reaction.     Patient provided a home exercise program consisting of: LAQ, Quad set, seated march, hip add, hip abd, and sidelying clam.     PLAN:  Patient will benefit from physical therapy (2) x/week for (6) weeks including manual therapy, therapeutic exercise, neuromuscular re-education, functional activities, modalities, and patient education.    Thank you for this referral.    These services are reasonable and necessary for the conditions set forth above while under my care.

## 2018-10-08 ENCOUNTER — CLINICAL SUPPORT (OUTPATIENT)
Dept: REHABILITATION | Facility: HOSPITAL | Age: 23
End: 2018-10-08
Attending: ORTHOPAEDIC SURGERY
Payer: MEDICAID

## 2018-10-08 DIAGNOSIS — R53.1 WEAKNESS GENERALIZED: Primary | ICD-10-CM

## 2018-10-08 PROCEDURE — 97014 ELECTRIC STIMULATION THERAPY: CPT | Mod: PO

## 2018-10-08 PROCEDURE — 97110 THERAPEUTIC EXERCISES: CPT | Mod: PO

## 2018-10-08 NOTE — PROGRESS NOTES
PHYSICAL THERAPY OUTPATIENT TREATMENT   Referring Provider:  Dr. Kael Oneill    Diagnosis:       ICD-10-CM ICD-9-CM    1. Weakness generalized R53.1 780.79        Orders:  Evaluate and Treat    Date of Initial Evaluation:  8/10/18    Orders :  18    Coding Cycle Visit # 7    SUBJECTIVE:  Patient reports he is feeling alright today.     Initial: Patient reports he had a knee replacement 5 years ago. 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 He had therapy after the surgery then switch to the [facility]. He was unable to continue therapy because of transportation. He has a vehicle now and is able to come to therapy. He feels like his leg is very weak and he is unable to exercise and feels he is getting out of shape because of it. He is also having pain in his left knee. He describes the pain as stiff and achy when sitting but when he stands he gets some muscle pain from being tired. His main goal for therapy is to get stronger so he can walk more and start to exercise again. Walking long distances, standing for long periods, and standing from sitting give him the most trouble.     No past medical history on file.    Patient Active Problem List   Diagnosis    Unequal leg length (acquired)    Muscle weakness (generalized)    Weakness of left lower extremity       No current outpatient medications on file.    OBJECTIVE:  Pain: now 6/10, worst 8/10 located in his left thigh and knee, described as achy and tired.     Sensation:  intact to light touch, lateral thigh and lower leg more senstive     Knee ROM:  Flexion     R WNL L 90      Extension   R 0  L -2    Patella Mobility: Hypomobile in all directions       Strength:  Quadriceps   R 4+/5 L 4-/5      Hamstrings   R 4+/5 L 4-/5     Gluteus Medius  R 4+/5 L 3-/5      Psoas    R 4+/5 L 4-/5     Glute Max   R 4+/5 L 3+/5          Ankle Plantarflexion  R 4+/5 L 4-/5     Ankle Dorsiflexion   B 4+/5    Muscle Length:  Hamstrings  WNL          Function: LOWER EXTREMITY FUNCTIONAL SCALE                EVAL  reeval  1. Any of your usual work, housework or school activities   1/4 2/4  2. Your usual hobbies, sporting     1/4 0/4  3. Getting in and out of tub      4/4  3/4  4. Walking between rooms      3/4  3/4  5. Putting on shoes or socks      2/4 2/4  6. Squatting        0/4 1/4  7. Lifting an object from the ground      2/4 2/4  8. Performing light activities around the home   2/4  3/4  9. Performing heavy activities around the home   1/4 1/4  10. Getting in and out of car      3/4  3/4  11. Walking 2 blocks       1/4 2/4  12.Walking a mile       0/4 1/4  13. Getting up and down 1 flight of stairs    2/4 2/4  14. Standing for 1 hour      2/4 2/4  15. Sitting for an hour       2/4 2/4  16. Running on even ground      0/4 0/4  17. Running on uneven ground     0/4 0/4  18. Making sharp turns when running fast    0/4 0/4  19. Hopping        0/4 1/4  20. Rolling over in bed       3/4  4/4    Patient reports 43% (Initial: 36%) ability based on score of the Lower Extremity Functional Scale.    Tenderness to palpation:  Over anterior knee    Other:  Trendelenburg gait on the left. Slight leg length discrepancy from surgery    ASSESSMENT:  Patient tolerated treatment well with fatigue in standing exercises. Patient rode bike for 7 minutes to loosen knee up before treatment.     Initial: The patient is a 23 y.o. year old male who presents to physical therapy with complaints of left leg pain and weakness.  Patient's impairments include decreased muscle strength, decrease ROM, decrease balance, and increased pain.  These impairments are limiting patient's ability to walk, stand, climb stairs, exercise, and sit.  Patient's prognosis is good.  Patient will benefit from skilled physical therapy intervention to increase muscle strength, increase ROM, decrease pain, and increase balance.    Co-morbidities which may impact the plan of care and  potentially impede the patient's progress in therapy include:  Chronic stage of condition, history of cancer in left leg    The patient's clinical presentation is stable.  Based on patient's stable clinical presentation, 2 co-morbidities, and examination of 1 body systems, patient presents with moderate complexity.    Short Term Goals:  (3 weeks)  1.  Patient will demonstrate decreased pain from 6/10 to 4/10 in order to tolerate walking.  2.  Patient will report improved function indicated by a score of 45% ability on the Lower Extremity Functional Scale  3.  Patient will be independent with home exercise program.    Long Term Goals:  (6 weeks)  1.  Patient will demonstrate increase quad strength to 4+/5 in order to stand from sitting.  2.  Patient will demonstrate increased glute med strength to 4/5 in order to climb stairs.  3.  Patient will demonstrate increased glute max strength to 4/5 in order to walk to class.  4.  Patient will report improved function indicated by a score of 55% ability on the Lower Extremity Functional Scale.    TREATMENT PROVIDED:    Initial evaluation completed.    Manual Therapy:  (0 minutes)  -deferred    Patella Glides in all directions    Soft tissue around patella    Therapeutic Exercise:  (46 minutes)      Quad Set -HEP    LAQ x30    Seated March x30    Hip Add x30    Hip Abd x30    Side lying Clam x30    Mini Squats x30    Gait     Standing March x30 -HEP    Shuttle x30    Single Leg Shuttle x30    Bike 5 minutes    TKE x30 -HEP    Straight Leg Raise x30    Step Ups x30    Modalities:  Neuro Re-ed for 8 minutes to vastus lateralis. Settings at Width 200, Rate 50, 1 sec on:20 secs off. Patient tolerated well with no skin irritation or adverse reaction. -deferred    Heat and IFC to left knee for 15 minutes. Patient tolerated well with no skin irritation or adverse reaction.     Patient provided a home exercise program consisting of: LAQ, Quad set, seated march, hip add, hip abd, and  sidelying clam.     PLAN:  Patient will benefit from physical therapy (2) x/week for (6) weeks including manual therapy, therapeutic exercise, neuromuscular re-education, functional activities, modalities, and patient education.    Thank you for this referral.    These services are reasonable and necessary for the conditions set forth above while under my care.

## 2018-10-10 ENCOUNTER — CLINICAL SUPPORT (OUTPATIENT)
Dept: REHABILITATION | Facility: HOSPITAL | Age: 23
End: 2018-10-10
Attending: ORTHOPAEDIC SURGERY
Payer: MEDICAID

## 2018-10-10 DIAGNOSIS — R53.1 WEAKNESS GENERALIZED: Primary | ICD-10-CM

## 2018-10-10 PROCEDURE — 97014 ELECTRIC STIMULATION THERAPY: CPT | Mod: PO

## 2018-10-10 PROCEDURE — 97110 THERAPEUTIC EXERCISES: CPT | Mod: PO

## 2018-10-10 NOTE — PROGRESS NOTES
PHYSICAL THERAPY OUTPATIENT TREATMENT   Referring Provider:  Dr. Kael Oneill    Diagnosis:       ICD-10-CM ICD-9-CM    1. Weakness generalized R53.1 780.79        Orders:  Evaluate and Treat    Date of Initial Evaluation:  8/10/18    Orders :  18    Coding Cycle Visit # 8    SUBJECTIVE:  Patient reports he is pretty sore. He reports he is still having knee pain but it is getting better.      Initial: Patient reports he had a knee replacement 5 years ago. 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 He had therapy after the surgery then switch to the [facility]. He was unable to continue therapy because of transportation. He has a vehicle now and is able to come to therapy. He feels like his leg is very weak and he is unable to exercise and feels he is getting out of shape because of it. He is also having pain in his left knee. He describes the pain as stiff and achy when sitting but when he stands he gets some muscle pain from being tired. His main goal for therapy is to get stronger so he can walk more and start to exercise again. Walking long distances, standing for long periods, and standing from sitting give him the most trouble.     No past medical history on file.    Patient Active Problem List   Diagnosis    Unequal leg length (acquired)    Muscle weakness (generalized)    Weakness of left lower extremity       No current outpatient medications on file.    OBJECTIVE:  Pain: now 6/10, worst 8/10 located in his left thigh and knee, described as achy and tired.     Sensation:  intact to light touch, lateral thigh and lower leg more senstive     Knee ROM:  Flexion     R WNL L 90      Extension   R 0  L -2    Patella Mobility: Hypomobile in all directions       Strength:  Quadriceps   R 4+/5 L 4-/5      Hamstrings   R 4+/5 L 4-/5     Gluteus Medius  R 4+/5 L 3-/5      Psoas    R 4+/5 L 4-/5     Glute Max   R 4+/5 L 3+/5          Ankle Plantarflexion  R 4+/5 L 4-/5     Ankle  Dorsiflexion   B 4+/5    Muscle Length:  Hamstrings WNL          Function: LOWER EXTREMITY FUNCTIONAL SCALE                EVAL  reeval  1. Any of your usual work, housework or school activities   1/4 2/4  2. Your usual hobbies, sporting     1/4 0/4  3. Getting in and out of tub      4/4  3/4  4. Walking between rooms      3/4  3/4  5. Putting on shoes or socks      2/4 2/4  6. Squatting        0/4 1/4  7. Lifting an object from the ground      2/4 2/4  8. Performing light activities around the home   2/4  3/4  9. Performing heavy activities around the home   1/4 1/4  10. Getting in and out of car      3/4  3/4  11. Walking 2 blocks       1/4 2/4  12.Walking a mile       0/4 1/4  13. Getting up and down 1 flight of stairs    2/4 2/4  14. Standing for 1 hour      2/4 2/4  15. Sitting for an hour       2/4 2/4  16. Running on even ground      0/4 0/4  17. Running on uneven ground     0/4 0/4  18. Making sharp turns when running fast    0/4 0/4  19. Hopping        0/4 1/4  20. Rolling over in bed       3/4  4/4    Patient reports 43% (Initial: 36%) ability based on score of the Lower Extremity Functional Scale.    Tenderness to palpation:  Over anterior knee    Other:  Trendelenburg gait on the left. Slight leg length discrepancy from surgery    ASSESSMENT:  Patient reports of sense of getting stronger during exercises. Patient tolerated today's treatment well. New exercises will be added next treatment if patient soreness is decreased.     Initial: The patient is a 23 y.o. year old male who presents to physical therapy with complaints of left leg pain and weakness.  Patient's impairments include decreased muscle strength, decrease ROM, decrease balance, and increased pain.  These impairments are limiting patient's ability to walk, stand, climb stairs, exercise, and sit.  Patient's prognosis is good.  Patient will benefit from skilled physical therapy intervention to increase muscle strength, increase  ROM, decrease pain, and increase balance.    Co-morbidities which may impact the plan of care and potentially impede the patient's progress in therapy include:  Chronic stage of condition, history of cancer in left leg    The patient's clinical presentation is stable.  Based on patient's stable clinical presentation, 2 co-morbidities, and examination of 1 body systems, patient presents with moderate complexity.    Short Term Goals:  (3 weeks)  1.  Patient will demonstrate decreased pain from 6/10 to 4/10 in order to tolerate walking.  2.  Patient will report improved function indicated by a score of 45% ability on the Lower Extremity Functional Scale  3.  Patient will be independent with home exercise program.    Long Term Goals:  (6 weeks)  1.  Patient will demonstrate increase quad strength to 4+/5 in order to stand from sitting.  2.  Patient will demonstrate increased glute med strength to 4/5 in order to climb stairs.  3.  Patient will demonstrate increased glute max strength to 4/5 in order to walk to class.  4.  Patient will report improved function indicated by a score of 55% ability on the Lower Extremity Functional Scale.    TREATMENT PROVIDED:    Initial evaluation completed.    Manual Therapy:  (0 minutes)  -deferred    Patella Glides in all directions    Soft tissue around patella    Therapeutic Exercise:  (46 minutes)      Quad Set -HEP    LAQ x30    Seated March x30    Hip Add x30    Hip Abd x30    Side lying Clam x30    Mini Squats x30    Gait     Standing March x30 -HEP    Shuttle x30    Single Leg Shuttle x30    Bike 5 minutes    TKE x30 -HEP    Straight Leg Raise x30    Step Ups x30    Modalities:  Neuro Re-ed for 8 minutes to vastus lateralis. Settings at Width 200, Rate 50, 1 sec on:20 secs off. Patient tolerated well with no skin irritation or adverse reaction. -deferred    Heat and IFC to left knee for 15 minutes. Patient tolerated well with no skin irritation or adverse reaction.     Patient  provided a home exercise program consisting of: LAQ, Quad set, seated march, hip add, hip abd, and sidelying clam.     PLAN:  Patient will benefit from physical therapy (2) x/week for (6) weeks including manual therapy, therapeutic exercise, neuromuscular re-education, functional activities, modalities, and patient education.    Thank you for this referral.    These services are reasonable and necessary for the conditions set forth above while under my care.

## 2018-10-23 ENCOUNTER — CLINICAL SUPPORT (OUTPATIENT)
Dept: REHABILITATION | Facility: HOSPITAL | Age: 23
End: 2018-10-23
Attending: ORTHOPAEDIC SURGERY
Payer: MEDICAID

## 2018-10-23 DIAGNOSIS — R53.1 WEAKNESS GENERALIZED: Primary | ICD-10-CM

## 2018-10-23 PROCEDURE — 97014 ELECTRIC STIMULATION THERAPY: CPT | Mod: PO

## 2018-10-23 PROCEDURE — 97110 THERAPEUTIC EXERCISES: CPT | Mod: PO

## 2018-10-23 NOTE — PROGRESS NOTES
PHYSICAL THERAPY OUTPATIENT TREATMENT   Referring Provider:  Dr. Kael Oneill    Diagnosis:       ICD-10-CM ICD-9-CM    1. Weakness generalized R53.1 780.79        Orders:  Evaluate and Treat    Date of Initial Evaluation:  8/10/18    Orders :  18    Coding Cycle Visit # 9    SUBJECTIVE:  Patient reports he isn't in any pain today.     Initial: Patient reports he had a knee replacement 5 years ago. 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 He had therapy after the surgery then switch to the [facility]. He was unable to continue therapy because of transportation. He has a vehicle now and is able to come to therapy. He feels like his leg is very weak and he is unable to exercise and feels he is getting out of shape because of it. He is also having pain in his left knee. He describes the pain as stiff and achy when sitting but when he stands he gets some muscle pain from being tired. His main goal for therapy is to get stronger so he can walk more and start to exercise again. Walking long distances, standing for long periods, and standing from sitting give him the most trouble.     No past medical history on file.    Patient Active Problem List   Diagnosis    Unequal leg length (acquired)    Muscle weakness (generalized)    Weakness of left lower extremity       No current outpatient medications on file.    OBJECTIVE:  Pain: now 6/10, worst 8/10 located in his left thigh and knee, described as achy and tired.     Sensation:  intact to light touch, lateral thigh and lower leg more senstive     Knee ROM:  Flexion     R WNL L 90      Extension   R 0  L -2    Patella Mobility: Hypomobile in all directions       Strength:  Quadriceps   R 4+/5 L 4-/5      Hamstrings   R 4+/5 L 4-/5     Gluteus Medius  R 4+/5 L 3-/5      Psoas    R 4+/5 L 4-/5     Glute Max   R 4+/5 L 3+/5          Ankle Plantarflexion  R 4+/5 L 4-/5     Ankle Dorsiflexion   B 4+/5    Muscle Length:  Hamstrings  WNL          Function: LOWER EXTREMITY FUNCTIONAL SCALE                EVAL  reeval  1. Any of your usual work, housework or school activities   1/4 2/4  2. Your usual hobbies, sporting     1/4 0/4  3. Getting in and out of tub      4/4  3/4  4. Walking between rooms      3/4  3/4  5. Putting on shoes or socks      2/4 2/4  6. Squatting        0/4 1/4  7. Lifting an object from the ground      2/4 2/4  8. Performing light activities around the home   2/4  3/4  9. Performing heavy activities around the home   1/4 1/4  10. Getting in and out of car      3/4  3/4  11. Walking 2 blocks       1/4 2/4  12.Walking a mile       0/4 1/4  13. Getting up and down 1 flight of stairs    2/4 2/4  14. Standing for 1 hour      2/4 2/4  15. Sitting for an hour       2/4 2/4  16. Running on even ground      0/4 0/4  17. Running on uneven ground     0/4 0/4  18. Making sharp turns when running fast    0/4 0/4  19. Hopping        0/4 1/4  20. Rolling over in bed       3/4  4/4    Patient reports 43% (Initial: 36%) ability based on score of the Lower Extremity Functional Scale.    Tenderness to palpation:  Over anterior knee    Other:  Trendelenburg gait on the left. Slight leg length discrepancy from surgery    ASSESSMENT:  Patient tolerated treatment well. Squats are coming easier and less painful. Clams were performed with resistance.     Initial: The patient is a 23 y.o. year old male who presents to physical therapy with complaints of left leg pain and weakness.  Patient's impairments include decreased muscle strength, decrease ROM, decrease balance, and increased pain.  These impairments are limiting patient's ability to walk, stand, climb stairs, exercise, and sit.  Patient's prognosis is good.  Patient will benefit from skilled physical therapy intervention to increase muscle strength, increase ROM, decrease pain, and increase balance.    Co-morbidities which may impact the plan of care and potentially impede the  patient's progress in therapy include:  Chronic stage of condition, history of cancer in left leg    The patient's clinical presentation is stable.  Based on patient's stable clinical presentation, 2 co-morbidities, and examination of 1 body systems, patient presents with moderate complexity.    Short Term Goals:  (3 weeks)  1.  Patient will demonstrate decreased pain from 6/10 to 4/10 in order to tolerate walking.  2.  Patient will report improved function indicated by a score of 45% ability on the Lower Extremity Functional Scale  3.  Patient will be independent with home exercise program.    Long Term Goals:  (6 weeks)  1.  Patient will demonstrate increase quad strength to 4+/5 in order to stand from sitting.  2.  Patient will demonstrate increased glute med strength to 4/5 in order to climb stairs.  3.  Patient will demonstrate increased glute max strength to 4/5 in order to walk to class.  4.  Patient will report improved function indicated by a score of 55% ability on the Lower Extremity Functional Scale.    TREATMENT PROVIDED:    Initial evaluation completed.    Manual Therapy:  (0 minutes)  -deferred    Patella Glides in all directions    Soft tissue around patella    Therapeutic Exercise:  (46 minutes)      Quad Set -HEP    LAQ x30    Seated March x30    Hip Add x30    Hip Abd x30    Side lying Clam x30    Mini Squats x30    Gait     Standing March x30 -HEP    Shuttle x30    Single Leg Shuttle x30    Bike 5 minutes    TKE x30 -HEP    Straight Leg Raise x30    Step Ups x30    Modalities:  Neuro Re-ed for 8 minutes to vastus lateralis. Settings at Width 200, Rate 50, 1 sec on:20 secs off. Patient tolerated well with no skin irritation or adverse reaction. -deferred    Heat and IFC to left knee for 15 minutes. Patient tolerated well with no skin irritation or adverse reaction.     Patient provided a home exercise program consisting of: LAQ, Quad set, seated march, hip add, hip abd, and sidelying clam.      PLAN:  Patient will benefit from physical therapy (2) x/week for (6) weeks including manual therapy, therapeutic exercise, neuromuscular re-education, functional activities, modalities, and patient education.    Thank you for this referral.    These services are reasonable and necessary for the conditions set forth above while under my care.

## 2018-10-25 ENCOUNTER — CLINICAL SUPPORT (OUTPATIENT)
Dept: REHABILITATION | Facility: HOSPITAL | Age: 23
End: 2018-10-25
Attending: ORTHOPAEDIC SURGERY
Payer: MEDICAID

## 2018-10-25 DIAGNOSIS — R53.1 WEAKNESS GENERALIZED: Primary | ICD-10-CM

## 2018-10-25 PROCEDURE — 97110 THERAPEUTIC EXERCISES: CPT | Mod: PO

## 2018-10-25 NOTE — PROGRESS NOTES
PHYSICAL THERAPY OUTPATIENT TREATMENT   Referring Provider:  Dr. Kael Oneill    Diagnosis:       ICD-10-CM ICD-9-CM    1. Weakness generalized R53.1 780.79        Orders:  Evaluate and Treat    Date of Initial Evaluation:  8/10/18    Orders :  18    Coding Cycle Visit # 10    SUBJECTIVE:  Patient reports he is feeling alright today.     Initial: Patient reports he had a knee replacement 5 years ago. 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 He had therapy after the surgery then switch to the [facility]. He was unable to continue therapy because of transportation. He has a vehicle now and is able to come to therapy. He feels like his leg is very weak and he is unable to exercise and feels he is getting out of shape because of it. He is also having pain in his left knee. He describes the pain as stiff and achy when sitting but when he stands he gets some muscle pain from being tired. His main goal for therapy is to get stronger so he can walk more and start to exercise again. Walking long distances, standing for long periods, and standing from sitting give him the most trouble.     No past medical history on file.    Patient Active Problem List   Diagnosis    Unequal leg length (acquired)    Muscle weakness (generalized)    Weakness of left lower extremity       No current outpatient medications on file.    OBJECTIVE:  Pain: now 6/10, worst 8/10 located in his left thigh and knee, described as achy and tired.     Sensation:  intact to light touch, lateral thigh and lower leg more senstive     Knee ROM:  Flexion     R WNL L 98      Extension   R 0  L -2    Patella Mobility: Hypomobile in all directions       Strength:  Quadriceps   R 4+/5 L 4-/5      Hamstrings   R 4+/5 L 4-/5     Gluteus Medius  R 4+/5 L 3-/5      Psoas    R 4+/5 L 4-/5     Glute Max   R 4+/5 L 3+/5          Ankle Plantarflexion  R 4+/5 L 4-/5     Ankle Dorsiflexion   B 4+/5    Muscle Length:  Hamstrings  WNL          Function: LOWER EXTREMITY FUNCTIONAL SCALE                EVAL  reeval  1. Any of your usual work, housework or school activities   1/4 2/4  2. Your usual hobbies, sporting     1/4 0/4  3. Getting in and out of tub      4/4  3/4  4. Walking between rooms      3/4  3/4  5. Putting on shoes or socks      2/4 2/4  6. Squatting        0/4 1/4  7. Lifting an object from the ground      2/4 2/4  8. Performing light activities around the home   2/4  3/4  9. Performing heavy activities around the home   1/4 1/4  10. Getting in and out of car      3/4  3/4  11. Walking 2 blocks       1/4 2/4  12.Walking a mile       0/4 1/4  13. Getting up and down 1 flight of stairs    2/4 2/4  14. Standing for 1 hour      2/4 2/4  15. Sitting for an hour       2/4 2/4  16. Running on even ground      0/4 0/4  17. Running on uneven ground     0/4 0/4  18. Making sharp turns when running fast    0/4 0/4  19. Hopping        0/4 1/4  20. Rolling over in bed       3/4  4/4    Patient reports 43% (Initial: 36%) ability based on score of the Lower Extremity Functional Scale.    Tenderness to palpation:  Over anterior knee    Other:  Trendelenburg gait on the left. Slight leg length discrepancy from surgery    ASSESSMENT:  Patient able to start on bike with full turns, previously had to do 1/2 turns to get warmed up before making full turns. Patient ROM improving. Patient tolerated new exercises well.     Initial: The patient is a 23 y.o. year old male who presents to physical therapy with complaints of left leg pain and weakness.  Patient's impairments include decreased muscle strength, decrease ROM, decrease balance, and increased pain.  These impairments are limiting patient's ability to walk, stand, climb stairs, exercise, and sit.  Patient's prognosis is good.  Patient will benefit from skilled physical therapy intervention to increase muscle strength, increase ROM, decrease pain, and increase  balance.    Co-morbidities which may impact the plan of care and potentially impede the patient's progress in therapy include:  Chronic stage of condition, history of cancer in left leg    The patient's clinical presentation is stable.  Based on patient's stable clinical presentation, 2 co-morbidities, and examination of 1 body systems, patient presents with moderate complexity.    Short Term Goals:  (3 weeks)  1.  Patient will demonstrate decreased pain from 6/10 to 4/10 in order to tolerate walking.  2.  Patient will report improved function indicated by a score of 45% ability on the Lower Extremity Functional Scale  3.  Patient will be independent with home exercise program.    Long Term Goals:  (6 weeks)  1.  Patient will demonstrate increase quad strength to 4+/5 in order to stand from sitting.  2.  Patient will demonstrate increased glute med strength to 4/5 in order to climb stairs.  3.  Patient will demonstrate increased glute max strength to 4/5 in order to walk to class.  4.  Patient will report improved function indicated by a score of 55% ability on the Lower Extremity Functional Scale.    TREATMENT PROVIDED:    Initial evaluation completed.    Manual Therapy:  (0 minutes)  -deferred    Patella Glides in all directions    Soft tissue around patella    Therapeutic Exercise:  (60 minutes)      Quad Set -HEP    LAQ x30    Seated March x30    Hip Add x30    Hip Abd x30    Side lying Clam x30    Mini Squats x30    Gait     Standing March x30 -HEP    Shuttle x30    Single Leg Shuttle x30    Bike 5 minutes    TKE x30 -HEP    Straight Leg Raise x30    Step Ups x30    SAQ x30    Heel Raises x30    Gastroc Stretch [id number]    Modalities:  Neuro Re-ed for 8 minutes to vastus lateralis. Settings at Width 200, Rate 50, 1 sec on:20 secs off. Patient tolerated well with no skin irritation or adverse reaction. -deferred    Heat and IFC to left knee for 0 minutes. Patient tolerated well with no skin irritation or adverse  reaction. -deferred today    Patient provided a home exercise program consisting of: LAQ, Quad set, seated march, hip add, hip abd, and sidelying clam.     PLAN:  Patient will benefit from physical therapy (2) x/week for (6) weeks including manual therapy, therapeutic exercise, neuromuscular re-education, functional activities, modalities, and patient education.    Thank you for this referral.    These services are reasonable and necessary for the conditions set forth above while under my care.

## 2018-11-01 ENCOUNTER — CLINICAL SUPPORT (OUTPATIENT)
Dept: REHABILITATION | Facility: HOSPITAL | Age: 23
End: 2018-11-01
Attending: ORTHOPAEDIC SURGERY
Payer: MEDICAID

## 2018-11-01 DIAGNOSIS — R53.1 WEAKNESS GENERALIZED: Primary | ICD-10-CM

## 2018-11-01 PROCEDURE — 97110 THERAPEUTIC EXERCISES: CPT | Mod: PO

## 2018-11-01 NOTE — PROGRESS NOTES
PHYSICAL THERAPY OUTPATIENT TREATMENT   Referring Provider:  Dr. Kael Oneill    Diagnosis:       ICD-10-CM ICD-9-CM    1. Weakness generalized R53.1 780.79        Orders:  Evaluate and Treat    Date of Initial Evaluation:  8/10/18    Orders :  18    Coding Cycle Visit # 11    SUBJECTIVE:  Patient reports he was a little tight before he got moving today but he feels ok right now.     Initial: Patient reports he had a knee replacement 5 years ago. 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 He had therapy after the surgery then switch to the [facility]. He was unable to continue therapy because of transportation. He has a vehicle now and is able to come to therapy. He feels like his leg is very weak and he is unable to exercise and feels he is getting out of shape because of it. He is also having pain in his left knee. He describes the pain as stiff and achy when sitting but when he stands he gets some muscle pain from being tired. His main goal for therapy is to get stronger so he can walk more and start to exercise again. Walking long distances, standing for long periods, and standing from sitting give him the most trouble.     No past medical history on file.    Patient Active Problem List   Diagnosis    Unequal leg length (acquired)    Muscle weakness (generalized)    Weakness of left lower extremity       No current outpatient medications on file.    OBJECTIVE:  Pain: now 6/10, worst 8/10 located in his left thigh and knee, described as achy and tired.     Sensation:  intact to light touch, lateral thigh and lower leg more senstive     Knee ROM:  Flexion     R WNL L 98      Extension   R 0  L -2    Patella Mobility: Hypomobile in all directions       Strength:  Quadriceps   R 4+/5 L 4-/5      Hamstrings   R 4+/5 L 4-/5     Gluteus Medius  R 4+/5 L 3-/5      Psoas    R 4+/5 L 4-/5     Glute Max   R 4+/5 L 3+/5          Ankle Plantarflexion  R 4+/5 L 4-/5     Ankle  Dorsiflexion   B 4+/5    Muscle Length:  Hamstrings WNL          Function: LOWER EXTREMITY FUNCTIONAL SCALE                EVAL  reeval  1. Any of your usual work, housework or school activities   1/4 2/4  2. Your usual hobbies, sporting     1/4 0/4  3. Getting in and out of tub      4/4  3/4  4. Walking between rooms      3/4  3/4  5. Putting on shoes or socks      2/4 2/4  6. Squatting        0/4 1/4  7. Lifting an object from the ground      2/4 2/4  8. Performing light activities around the home   2/4  3/4  9. Performing heavy activities around the home   1/4 1/4  10. Getting in and out of car      3/4  3/4  11. Walking 2 blocks       1/4 2/4  12.Walking a mile       0/4 1/4  13. Getting up and down 1 flight of stairs    2/4 2/4  14. Standing for 1 hour      2/4 2/4  15. Sitting for an hour       2/4 2/4  16. Running on even ground      0/4 0/4  17. Running on uneven ground     0/4 0/4  18. Making sharp turns when running fast    0/4 0/4  19. Hopping        0/4 1/4  20. Rolling over in bed       3/4  4/4    Patient reports 43% (Initial: 36%) ability based on score of the Lower Extremity Functional Scale.    Tenderness to palpation:  Over anterior knee    Other:  Trendelenburg gait on the left. Slight leg length discrepancy from surgery    ASSESSMENT:  Patient tolerated treatment well with no complaints. Patient deferred modalities.     Initial: The patient is a 23 y.o. year old male who presents to physical therapy with complaints of left leg pain and weakness.  Patient's impairments include decreased muscle strength, decrease ROM, decrease balance, and increased pain.  These impairments are limiting patient's ability to walk, stand, climb stairs, exercise, and sit.  Patient's prognosis is good.  Patient will benefit from skilled physical therapy intervention to increase muscle strength, increase ROM, decrease pain, and increase balance.    Co-morbidities which may impact the plan of care and  potentially impede the patient's progress in therapy include:  Chronic stage of condition, history of cancer in left leg    The patient's clinical presentation is stable.  Based on patient's stable clinical presentation, 2 co-morbidities, and examination of 1 body systems, patient presents with moderate complexity.    Short Term Goals:  (3 weeks)  1.  Patient will demonstrate decreased pain from 6/10 to 4/10 in order to tolerate walking.  2.  Patient will report improved function indicated by a score of 45% ability on the Lower Extremity Functional Scale  3.  Patient will be independent with home exercise program.    Long Term Goals:  (6 weeks)  1.  Patient will demonstrate increase quad strength to 4+/5 in order to stand from sitting.  2.  Patient will demonstrate increased glute med strength to 4/5 in order to climb stairs.  3.  Patient will demonstrate increased glute max strength to 4/5 in order to walk to class.  4.  Patient will report improved function indicated by a score of 55% ability on the Lower Extremity Functional Scale.    TREATMENT PROVIDED:    Initial evaluation completed.    Manual Therapy:  (0 minutes)  -deferred    Patella Glides in all directions    Soft tissue around patella    Therapeutic Exercise:  (60 minutes)      Quad Set -HEP    LAQ x30    Seated March x30    Hip Add x30    Hip Abd x30    Side lying Clam x30    Mini Squats x30    Gait     Standing March x30 -HEP    Shuttle x30    Single Leg Shuttle x30    Bike 5 minutes    TKE x30 -HEP    Straight Leg Raise x30    Step Ups x30    SAQ x30    Heel Raises x30    Gastroc Stretch [id number]    Modalities:  Neuro Re-ed for 8 minutes to vastus lateralis. Settings at Width 200, Rate 50, 1 sec on:20 secs off. Patient tolerated well with no skin irritation or adverse reaction. -deferred    Heat and IFC to left knee for 0 minutes. Patient tolerated well with no skin irritation or adverse reaction. -deferred today    Patient provided a home exercise  program consisting of: LAQ, Quad set, seated march, hip add, hip abd, and sidelying clam.     PLAN:  Patient will benefit from physical therapy (2) x/week for (6) weeks including manual therapy, therapeutic exercise, neuromuscular re-education, functional activities, modalities, and patient education.     Thank you for this referral.    These services are reasonable and necessary for the conditions set forth above while under my care.

## 2018-11-06 ENCOUNTER — CLINICAL SUPPORT (OUTPATIENT)
Dept: REHABILITATION | Facility: HOSPITAL | Age: 23
End: 2018-11-06
Attending: ORTHOPAEDIC SURGERY
Payer: MEDICAID

## 2018-11-06 DIAGNOSIS — R53.1 WEAKNESS GENERALIZED: Primary | ICD-10-CM

## 2018-11-06 PROCEDURE — 97014 ELECTRIC STIMULATION THERAPY: CPT | Mod: PO

## 2018-11-06 PROCEDURE — 97110 THERAPEUTIC EXERCISES: CPT | Mod: PO

## 2018-11-06 NOTE — PROGRESS NOTES
PHYSICAL THERAPY OUTPATIENT TREATMENT   Referring Provider:  Dr. Kael Oneill    Diagnosis:       ICD-10-CM ICD-9-CM    1. Weakness generalized R53.1 780.79        Orders:  Evaluate and Treat    Date of Initial Evaluation:  8/10/18    Orders :  18    Coding Cycle Visit # 12    SUBJECTIVE:  Patient reports he is feeling alright today.     Initial: Patient reports he had a knee replacement 5 years ago. 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 He had therapy after the surgery then switch to the [facility]. He was unable to continue therapy because of transportation. He has a vehicle now and is able to come to therapy. He feels like his leg is very weak and he is unable to exercise and feels he is getting out of shape because of it. He is also having pain in his left knee. He describes the pain as stiff and achy when sitting but when he stands he gets some muscle pain from being tired. His main goal for therapy is to get stronger so he can walk more and start to exercise again. Walking long distances, standing for long periods, and standing from sitting give him the most trouble.     No past medical history on file.    Patient Active Problem List   Diagnosis    Unequal leg length (acquired)    Muscle weakness (generalized)    Weakness of left lower extremity       No current outpatient medications on file.    OBJECTIVE:  Pain: now 6/10, worst 8/10 located in his left thigh and knee, described as achy and tired.     Sensation:  intact to light touch, lateral thigh and lower leg more senstive     Knee ROM:  Flexion     R WNL L 98      Extension   R 0  L -2    Patella Mobility: Hypomobile in all directions       Strength:  Quadriceps   R 4+/5 L 4-/5      Hamstrings   R 4+/5 L 4-/5     Gluteus Medius  R 4+/5 L 3-/5      Psoas    R 4+/5 L 4-/5     Glute Max   R 4+/5 L 3+/5          Ankle Plantarflexion  R 4+/5 L 4-/5     Ankle Dorsiflexion   B 4+/5    Muscle Length:  Hamstrings  WNL          Function: LOWER EXTREMITY FUNCTIONAL SCALE                EVAL  reeval  1. Any of your usual work, housework or school activities   1/4 2/4  2. Your usual hobbies, sporting     1/4 0/4  3. Getting in and out of tub      4/4  3/4  4. Walking between rooms      3/4  3/4  5. Putting on shoes or socks      2/4 2/4  6. Squatting        0/4 1/4  7. Lifting an object from the ground      2/4 2/4  8. Performing light activities around the home   2/4  3/4  9. Performing heavy activities around the home   1/4 1/4  10. Getting in and out of car      3/4  3/4  11. Walking 2 blocks       1/4 2/4  12.Walking a mile       0/4 1/4  13. Getting up and down 1 flight of stairs    2/4 2/4  14. Standing for 1 hour      2/4 2/4  15. Sitting for an hour       2/4 2/4  16. Running on even ground      0/4 0/4  17. Running on uneven ground     0/4 0/4  18. Making sharp turns when running fast    0/4 0/4  19. Hopping        0/4 1/4  20. Rolling over in bed       3/4  4/4    Patient reports 43% (Initial: 36%) ability based on score of the Lower Extremity Functional Scale.    Tenderness to palpation:  Over anterior knee    Other:  Trendelenburg gait on the left. Slight leg length discrepancy from surgery    ASSESSMENT:  Patient tolerated treatment well. Exercises performed with less fatigue and with better technique.     Initial: The patient is a 23 y.o. year old male who presents to physical therapy with complaints of left leg pain and weakness.  Patient's impairments include decreased muscle strength, decrease ROM, decrease balance, and increased pain.  These impairments are limiting patient's ability to walk, stand, climb stairs, exercise, and sit.  Patient's prognosis is good.  Patient will benefit from skilled physical therapy intervention to increase muscle strength, increase ROM, decrease pain, and increase balance.    Co-morbidities which may impact the plan of care and potentially impede the patient's  progress in therapy include:  Chronic stage of condition, history of cancer in left leg    The patient's clinical presentation is stable.  Based on patient's stable clinical presentation, 2 co-morbidities, and examination of 1 body systems, patient presents with moderate complexity.    Short Term Goals:  (3 weeks)  1.  Patient will demonstrate decreased pain from 6/10 to 4/10 in order to tolerate walking.  2.  Patient will report improved function indicated by a score of 45% ability on the Lower Extremity Functional Scale  3.  Patient will be independent with home exercise program.    Long Term Goals:  (6 weeks)  1.  Patient will demonstrate increase quad strength to 4+/5 in order to stand from sitting.  2.  Patient will demonstrate increased glute med strength to 4/5 in order to climb stairs.  3.  Patient will demonstrate increased glute max strength to 4/5 in order to walk to class.  4.  Patient will report improved function indicated by a score of 55% ability on the Lower Extremity Functional Scale.    TREATMENT PROVIDED:    Initial evaluation completed.    Manual Therapy:  (0 minutes)  -deferred    Patella Glides in all directions    Soft tissue around patella    Therapeutic Exercise:  (60 minutes)      Quad Set -HEP    LAQ x30    Seated March x30    Hip Add x30    Hip Abd x30    Side lying Clam x30    Mini Squats x30    Gait     Standing March x30 -HEP    Shuttle x30    Single Leg Shuttle x30    Bike 5 minutes    TKE x30 -HEP    Straight Leg Raise x30    Step Ups x30    SAQ x30    Heel Raises x30    Gastroc Stretch [id number]    Modalities:  Neuro Re-ed for 8 minutes to vastus lateralis. Settings at Width 200, Rate 50, 1 sec on:20 secs off. Patient tolerated well with no skin irritation or adverse reaction. -deferred    Heat and IFC to left knee for 15 minutes. Patient tolerated well with no skin irritation or adverse reaction.     Patient provided a home exercise program consisting of: LAQ, Quad set, seated  march, hip add, hip abd, and sidelying clam.     PLAN:  Patient will benefit from physical therapy (2) x/week for (6) weeks including manual therapy, therapeutic exercise, neuromuscular re-education, functional activities, modalities, and patient education.     Thank you for this referral.    These services are reasonable and necessary for the conditions set forth above while under my care.

## 2018-11-08 ENCOUNTER — CLINICAL SUPPORT (OUTPATIENT)
Dept: REHABILITATION | Facility: HOSPITAL | Age: 23
End: 2018-11-08
Attending: ORTHOPAEDIC SURGERY
Payer: MEDICAID

## 2018-11-08 DIAGNOSIS — R53.1 WEAKNESS GENERALIZED: Primary | ICD-10-CM

## 2018-11-08 PROCEDURE — 97110 THERAPEUTIC EXERCISES: CPT | Mod: PO

## 2018-11-08 NOTE — PROGRESS NOTES
PHYSICAL THERAPY OUTPATIENT TREATMENT   Referring Provider:  Dr. Kael Oneill    Diagnosis:       ICD-10-CM ICD-9-CM    1. Weakness generalized R53.1 780.79        Orders:  Evaluate and Treat    Date of Initial Evaluation:  8/10/18    Orders :  18    Coding Cycle Visit # 13    SUBJECTIVE:  Patient reports he is doing pretty good today.     Initial: Patient reports he had a knee replacement 5 years ago. 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 He had therapy after the surgery then switch to the [facility]. He was unable to continue therapy because of transportation. He has a vehicle now and is able to come to therapy. He feels like his leg is very weak and he is unable to exercise and feels he is getting out of shape because of it. He is also having pain in his left knee. He describes the pain as stiff and achy when sitting but when he stands he gets some muscle pain from being tired. His main goal for therapy is to get stronger so he can walk more and start to exercise again. Walking long distances, standing for long periods, and standing from sitting give him the most trouble.     No past medical history on file.    Patient Active Problem List   Diagnosis    Unequal leg length (acquired)    Muscle weakness (generalized)    Weakness of left lower extremity       No current outpatient medications on file.    OBJECTIVE:  Pain: now 6/10, worst 8/10 located in his left thigh and knee, described as achy and tired.     Sensation:  intact to light touch, lateral thigh and lower leg more senstive     Knee ROM:  Flexion     R WNL L 98      Extension   R 0  L -2    Patella Mobility: Hypomobile in all directions       Strength:  Quadriceps   R 4+/5 L 4-/5      Hamstrings   R 4+/5 L 4-/5     Gluteus Medius  R 4+/5 L 3-/5      Psoas    R 4+/5 L 4-/5     Glute Max   R 4+/5 L 3+/5          Ankle Plantarflexion  R 4+/5 L 4-/5     Ankle Dorsiflexion   B 4+/5    Muscle Length:  Hamstrings  WNL          Function: LOWER EXTREMITY FUNCTIONAL SCALE                EVAL  reeval  1. Any of your usual work, housework or school activities   1/4 2/4  2. Your usual hobbies, sporting     1/4 0/4  3. Getting in and out of tub      4/4  3/4  4. Walking between rooms      3/4  3/4  5. Putting on shoes or socks      2/4 2/4  6. Squatting        0/4 1/4  7. Lifting an object from the ground      2/4 2/4  8. Performing light activities around the home   2/4  3/4  9. Performing heavy activities around the home   1/4 1/4  10. Getting in and out of car      3/4  3/4  11. Walking 2 blocks       1/4 2/4  12.Walking a mile       0/4 1/4  13. Getting up and down 1 flight of stairs    2/4 2/4  14. Standing for 1 hour      2/4 2/4  15. Sitting for an hour       2/4 2/4  16. Running on even ground      0/4 0/4  17. Running on uneven ground     0/4 0/4  18. Making sharp turns when running fast    0/4 0/4  19. Hopping        0/4 1/4  20. Rolling over in bed       3/4  4/4    Patient reports 43% (Initial: 36%) ability based on score of the Lower Extremity Functional Scale.    Tenderness to palpation:  Over anterior knee    Other:  Trendelenburg gait on the left. Slight leg length discrepancy from surgery    ASSESSMENT:  Patient tolerated treatment well. Patient declined [facility] and IFC this visit.     Initial: The patient is a 23 y.o. year old male who presents to physical therapy with complaints of left leg pain and weakness.  Patient's impairments include decreased muscle strength, decrease ROM, decrease balance, and increased pain.  These impairments are limiting patient's ability to walk, stand, climb stairs, exercise, and sit.  Patient's prognosis is good.  Patient will benefit from skilled physical therapy intervention to increase muscle strength, increase ROM, decrease pain, and increase balance.    Co-morbidities which may impact the plan of care and potentially impede the patient's progress in therapy include:   Chronic stage of condition, history of cancer in left leg    The patient's clinical presentation is stable.  Based on patient's stable clinical presentation, 2 co-morbidities, and examination of 1 body systems, patient presents with moderate complexity.    Short Term Goals:  (3 weeks)  1.  Patient will demonstrate decreased pain from 6/10 to 4/10 in order to tolerate walking.  2.  Patient will report improved function indicated by a score of 45% ability on the Lower Extremity Functional Scale  3.  Patient will be independent with home exercise program.    Long Term Goals:  (6 weeks)  1.  Patient will demonstrate increase quad strength to 4+/5 in order to stand from sitting.  2.  Patient will demonstrate increased glute med strength to 4/5 in order to climb stairs.  3.  Patient will demonstrate increased glute max strength to 4/5 in order to walk to class.  4.  Patient will report improved function indicated by a score of 55% ability on the Lower Extremity Functional Scale.    TREATMENT PROVIDED:    Initial evaluation completed.    Manual Therapy:  (0 minutes)  -deferred    Patella Glides in all directions    Soft tissue around patella    Therapeutic Exercise:  (60 minutes)      Quad Set -HEP    LAQ x30    Seated March x30    Hip Add x30    Hip Abd x30    Side lying Clam x30    Mini Squats x30    Gait     Standing March x30 -HEP    Shuttle x30    Single Leg Shuttle x30    Bike 5 minutes    TKE x30 -HEP    Straight Leg Raise x30    Step Ups x30    SAQ x30    Heel Raises x30    Gastroc Stretch [id number]    Modalities:  Neuro Re-ed for 8 minutes to vastus lateralis. Settings at Width 200, Rate 50, 1 sec on:20 secs off. Patient tolerated well with no skin irritation or adverse reaction. -deferred    Heat and IFC to left knee for 0 minutes. Patient tolerated well with no skin irritation or adverse reaction. -deferred today    Patient provided a home exercise program consisting of: LAQ, Quad set, seated march, hip add, hip  abd, and sidelying clam.     PLAN:  Patient will benefit from physical therapy (2) x/week for (6) weeks including manual therapy, therapeutic exercise, neuromuscular re-education, functional activities, modalities, and patient education.     Thank you for this referral.    These services are reasonable and necessary for the conditions set forth above while under my care.

## 2021-10-10 ENCOUNTER — HOSPITAL ENCOUNTER (EMERGENCY)
Facility: HOSPITAL | Age: 26
Discharge: HOME | End: 2021-10-10
Attending: EMERGENCY MEDICINE
Payer: MEDICAID

## 2021-10-10 VITALS
HEIGHT: 68 IN | HEART RATE: 120 BPM | WEIGHT: 200 LBS | RESPIRATION RATE: 20 BRPM | DIASTOLIC BLOOD PRESSURE: 74 MMHG | TEMPERATURE: 99 F | SYSTOLIC BLOOD PRESSURE: 130 MMHG | BODY MASS INDEX: 30.31 KG/M2 | OXYGEN SATURATION: 97 %

## 2021-10-10 DIAGNOSIS — S00.01XA ABRASION OF SCALP, INITIAL ENCOUNTER: Primary | ICD-10-CM

## 2021-10-10 PROCEDURE — 25000003 PHARM REV CODE 250: Performed by: EMERGENCY MEDICINE

## 2021-10-10 PROCEDURE — 99284 PR EMERGENCY DEPT VISIT,LEVEL IV: ICD-10-PCS | Mod: ,,, | Performed by: EMERGENCY MEDICINE

## 2021-10-10 PROCEDURE — 99284 EMERGENCY DEPT VISIT MOD MDM: CPT | Mod: ,,, | Performed by: EMERGENCY MEDICINE

## 2021-10-10 PROCEDURE — 99283 EMERGENCY DEPT VISIT LOW MDM: CPT

## 2021-10-10 RX ORDER — BACITRACIN ZINC 500 [USP'U]/G
OINTMENT TOPICAL
Status: COMPLETED | OUTPATIENT
Start: 2021-10-10 | End: 2021-10-10

## 2021-10-10 RX ADMIN — BACITRACIN 1 EACH: 500 OINTMENT TOPICAL at 11:10

## 2022-02-10 DIAGNOSIS — M79.605 LEFT LEG PAIN: Primary | ICD-10-CM

## 2022-02-28 ENCOUNTER — CLINICAL SUPPORT (OUTPATIENT)
Dept: REHABILITATION | Facility: HOSPITAL | Age: 27
End: 2022-02-28
Payer: MEDICAID

## 2022-02-28 DIAGNOSIS — R29.898 WEAKNESS OF LEFT LOWER EXTREMITY: Primary | ICD-10-CM

## 2022-02-28 DIAGNOSIS — M25.662 DECREASED RANGE OF MOTION (ROM) OF LEFT KNEE: ICD-10-CM

## 2022-02-28 DIAGNOSIS — R26.9 GAIT ABNORMALITY: ICD-10-CM

## 2022-02-28 DIAGNOSIS — M79.605 LEFT LEG PAIN: ICD-10-CM

## 2022-02-28 PROCEDURE — 97110 THERAPEUTIC EXERCISES: CPT | Mod: PN | Performed by: PHYSICAL THERAPIST

## 2022-02-28 PROCEDURE — 97161 PT EVAL LOW COMPLEX 20 MIN: CPT | Mod: PN | Performed by: PHYSICAL THERAPIST

## 2022-02-28 NOTE — PLAN OF CARE
[facility]   Physical Therapy Initial Evaluation     Date: 2/28/2022   Name: Cesario Griffith  Clinic Number: [id number]    Therapy Diagnosis:   Encounter Diagnoses   Name Primary?    Left leg pain     Decreased range of motion (ROM) of left knee     Gait abnormality     Weakness of left lower extremity Yes     Physician: Schoen, Jonathan, MD    Physician Orders: PT Eval and Treat   Medical Diagnosis from Referral: Left leg pain  Evaluation Date: 2/28/2022  Authorization Period Expiration: 4/1/2022  Plan of Care Expiration: 4/25/2022  Progress Note Due: 3/28/2022  Visit # / Visits authorized: 1/ 1 (POC 1/12)   FOTO: 1/ 3     Precautions: Standard  Insurance: Payor: MEDICAID / Plan: [place] [employer] / Product Type: Managed Medicaid /     Time In: 1415  Time Out: 1500  Total Appointment Time (timed & untimed codes): 45 minutes      SUBJECTIVE   Date of onset: 8/2012    History of current condition - Cesario reports: he underwent a left TKA with jose daniel placement in August of 2012 secondary to bone cancer. He states he did not do much PT following surgery and is no walking with a limp. He reports pain with prolonged standing and walking. He notes difficulty ascending stairs. He reports limitation in ROM and weakness of the left LE    Falls: 0    Imaging, none: in Epic    Prior Therapy: PT  Social History: Single lives with an adult   Occupation: Not working  Prior Level of Function: Independent  Current Level of Function: Decreased ability to perform ADL and limited tolerance to standing and walking.    Pain:  Current 0/10, worst 8/10, best 5/10   Location: left knee    Description: Sharp  Aggravating Factors: Standing, Bending and Walking  Easing Factors: ice    Patients goals: Improve gait pattern, increase strength and improve ROM     Medical History:   No past medical history on file.    Surgical History:   Cesario Griffith  has no past surgical history on  file.    Medications:   Cesario currently has no medications in their medication list.    Allergies:   Review of patient's allergies indicates:  Not on File       OBJECTIVE     Posture: Decreased lumbar lordosis and forward head in standing  Palpation: Moderate point tenderness noted with palpation of the medial and lateral borders of the patella  Sensation: Intact    Range of Motion/Strength:     Knee ROM Left Right Pain/Dysfunction with Movement   Knee flexion 74 degrees 130 degress    Knee extension -20 degrees 0 degrees      LE MMT Left Right   Hip flexion 4/5 5/5   Hip abduction 4-/5 5/5   Hip adduction 4/5 5/5   Hip external rotation 4-/5 5/5   Hip Internal rotation 4/5 5/5   Knee extension 4/5 5/5   Knee flexion 4+/5 5/5       Flexibility Left Right   Hamstrings 48 degrees 60 degrees   Achilles 0 degrees 4 degrees     Girth measurements Left Right   5 cm above MJL  37.2 cm  41.2 cm    At MJL  37.4 cm  39.0 cm    5 cm below MJL 35.5 cm  37.5 cm        Gait Without AD   Analysis Decreased stance time on the left. Displays limited knee flexion in swing phase.       Limitation/Restriction for FOTO  Survey    Therapist reviewed FOTO scores for Cesario Griffith on 2/28/2022.   FOTO documents entered into [employer] - see Media section.    Limitation Score: 69%         TREATMENT     Total Treatment time (time-based codes) separate from Evaluation: 30 minutes      Cesario received the treatments listed below:      THERAPEUTIC EXERCISES to develop strength, ROM and flexibility for 30 minutes including :     Seated Hamstring stretch 3 x 30 sec  Seated Gastroc-soleus stretch 3 x 30 sec  Ottoman stretch 3 min  Quad sets with heel prop 3 x 10  AASLR 3 x 10  Side lying AA hip abduction 3 x 10  Heel slides 3 x 10  Bridging with hip abduction Green theraband 3 x 10   Prone hangs 3 min    PATIENT EDUCATION AND HOME EXERCISES     Education provided:   - Importance of obtaining full knee ROM ASAP    Written Home Exercises Provided:  yes. Exercises were reviewed and Cesario was able to demonstrate them prior to the end of the session.  Cesario demonstrated good  understanding of the education provided. See EMR under Patient Instructions for exercises provided during therapy sessions.    ASSESSMENT     Cesario is a 26 y.o. male referred to outpatient Physical Therapy with a medical diagnosis of Left leg pain. Patient presents with     1. Left knee pain  2. Decreased left knee ROM  3. Decreased left knee strength  4. Decreased ability to perform ADL and limited tolerance to standing and walking.      Patient prognosis is Fair.   Patientt will benefit from skilled outpatient Physical Therapy to address the deficits stated above and in the chart below, provide patient /family education, and to maximize patientt's level of independence.     Plan of care discussed with patient: Yes  Patient's spiritual, cultural and educational needs considered and patient is agreeable to the plan of care and goals as stated below:     Anticipated Barriers for therapy: none    Medical Necessity is demonstrated by the following  History  Co-morbidities and personal factors that may impact the plan of care Co-morbidities:   history of cancer    Personal Factors:   no deficits     low   Examination  Body Structures and Functions, activity limitations and participation restrictions that may impact the plan of care Body Regions:   lower extremities    Body Systems:    ROM  strength  gait    Participation Restrictions:   none    Activity limitations:   Learning and applying knowledge  no deficits    General Tasks and Commands  no deficits    Communication  no deficits    Mobility  walking    Self care  no deficits    Domestic Life  no deficits    Interactions/Relationships  no deficits    Life Areas  no deficits    Community and Social Life  no deficits         moderate   Clinical Presentation stable and uncomplicated low   Decision Making/ Complexity Score: low      Goals:    Short Term Goals (STG) # weeks Goal Review Date Reviewed Date Met   1. The patient will begin a written HEP 1 Initial 2/28/2022    2. Increase knee ROM to -10* - 100* 3 Initial 2/28/2022    3. Decrease soft tissue tenderness to mild 3 Initial 2/28/2022      Long Term Goals (LTG) # weeks Goal Review Date Reviewed Date Met   1. The patient will be independent with a HEP for maintenance 6 Initial 2/28/2022    2. Increase knee ROM to 0* to 115* 6 Initial 2/28/2022    3. Increase knee strength to 5/5 6 Initial 2/28/2022    4. The patient will ambulate on a community level without gait deviation & without AD . 6 Initial 2/28/2022    5. Decrease FOTO to < 36%.   6 Initial 2/28/2022        PLAN   Plan of care Certification: 2/28/2022 to 4/22/2022.    Outpatient Physical Therapy 2 times weekly for 6 weeks to include the following interventions: Manual Therapy, Neuromuscular Re-ed, Patient Education, Therapeutic Activities and Therapeutic Exercise.     Duy Duffy, PT      I CERTIFY THE NEED FOR THESE SERVICES FURNISHED UNDER THIS PLAN OF TREATMENT AND WHILE UNDER MY CARE   Physician's comments:     Physician's Signature: ___________________________________________________

## 2022-03-14 ENCOUNTER — CLINICAL SUPPORT (OUTPATIENT)
Dept: REHABILITATION | Facility: HOSPITAL | Age: 27
End: 2022-03-14
Payer: MEDICAID

## 2022-03-14 DIAGNOSIS — R29.898 WEAKNESS OF LEFT LOWER EXTREMITY: ICD-10-CM

## 2022-03-14 DIAGNOSIS — R26.9 GAIT ABNORMALITY: ICD-10-CM

## 2022-03-14 DIAGNOSIS — M25.662 DECREASED RANGE OF MOTION (ROM) OF LEFT KNEE: ICD-10-CM

## 2022-03-14 DIAGNOSIS — M79.605 LEFT LEG PAIN: Primary | ICD-10-CM

## 2022-03-14 PROCEDURE — 97110 THERAPEUTIC EXERCISES: CPT | Mod: PN | Performed by: PHYSICAL THERAPIST

## 2022-03-14 NOTE — PROGRESS NOTES
[facility]   Physical Therapy Treatment Note     Name: Cesario Griffith  Clinic Number: [id number]    Therapy Diagnosis:   Encounter Diagnoses   Name Primary?    Left leg pain Yes    Decreased range of motion (ROM) of left knee     Gait abnormality     Weakness of left lower extremity      Physician: Schoen, Jonathan, MD     Visit Date: 3/14/2022    Physician Orders: PT Eval and Treat   Medical Diagnosis from Referral: Left leg pain  Evaluation Date: 2/28/2022  Authorization Period Expiration: 4/1/2022  Plan of Care Expiration: 4/25/2022  Progress Note Due: 3/28/2022  Visit # / Visits authorized: 1/ 1 (POC 1/12)   FOTO: 1/ 3         Time In: 1500  Time Out: 1545  Total Billable Time: 45 minutes    PTA Visit #: 0/5     Precautions: Standard    Insurance: Payor: MEDICAID / [place]: [place] [employer] / Product Type: Managed Medicaid /     SUBJECTIVE     Pt reports: he has not been performing exercises at home.  He was not compliant with home exercise program.  Response to previous treatment: no change  Functional change: first visit since eval    Pain: 4/10  Location: left knee      OBJECTIVE     Objective Measures updated at progress report unless specified.     Treatment       Cesario received the treatments listed below:      THERAPEUTIC EXERCISES to develop strength, ROM and flexibility for 45 minutes including :   Long sitting Hamstring stretch 3 x 30 sec  Long sitting Gastroc-soleus stretch 3 x 30 sec  Prone hangs 4 min 5#  Quad sets with NMES 10 min  AASLR 2 x 10  Side lying hip flexion x 10  Bridging 3 x 10  Heel slides 3 x 10    Adductor squeeze 3 x 10  Hook lying abduction 3 x 10    Manual Therapy  Distracted knee extension in prone      Patient Education and Home Exercises     Home Exercises Provided and Patient Education Provided     Education provided:   - Continue HEP    Written Home Exercises Provided: Patient instructed to cont prior HEP. Exercises were reviewed and Cesario  was able to demonstrate them prior to the end of the session.  Cesario demonstrated good  understanding of the education provided. See EMR under Patient Instructions for exercises provided during therapy sessions    ASSESSMENT     Patient presents with antalgic gait pattern with decreased stance time on the     Cesario Is progressing well towards his goals.   Pt prognosis is Good.     Pt will continue to benefit from skilled outpatient physical therapy to address the deficits listed in the problem list box on initial evaluation, provide pt/family education and to maximize pt's level of independence in the home and community environment.     Pt's spiritual, cultural and educational needs considered and pt agreeable to plan of care and goals.     Anticipated barriers to physical therapy: none    Goals:   Short Term Goals (STG) # weeks Goal Review Date Reviewed Date Met   1. The patient will begin a written HEP 1 progressing 3/14/2022       2. Increase knee ROM to -10* - 100* 3 progressing 3/14/2022       3. Decrease soft tissue tenderness to mild 3 progressing 3/14/2022          Long Term Goals (LTG) # weeks Goal Review Date Reviewed Date Met   1. The patient will be independent with a HEP for maintenance 6 progressing 3/14/2022       2. Increase knee ROM to 0* to 115* 6 progressing 3/14/2022       3. Increase knee strength to 5/5 6 progressing 3/14/2022       4. The patient will ambulate on a community level without gait deviation & without AD . 6 progressing 3/14/2022       5. Decrease FOTO to < 36%.   6 progressing 3/14/2022               PLAN     Continue with physical therapy as per plan of care      Duy Duffy, PT

## 2022-03-16 ENCOUNTER — CLINICAL SUPPORT (OUTPATIENT)
Dept: REHABILITATION | Facility: HOSPITAL | Age: 27
End: 2022-03-16
Payer: MEDICAID

## 2022-03-16 ENCOUNTER — DOCUMENTATION ONLY (OUTPATIENT)
Dept: REHABILITATION | Facility: HOSPITAL | Age: 27
End: 2022-03-16

## 2022-03-16 DIAGNOSIS — M79.605 LEFT LEG PAIN: Primary | ICD-10-CM

## 2022-03-16 DIAGNOSIS — R26.9 GAIT ABNORMALITY: ICD-10-CM

## 2022-03-16 DIAGNOSIS — M25.662 DECREASED RANGE OF MOTION (ROM) OF LEFT KNEE: ICD-10-CM

## 2022-03-16 DIAGNOSIS — R29.898 WEAKNESS OF LEFT LOWER EXTREMITY: ICD-10-CM

## 2022-03-16 PROCEDURE — 97110 THERAPEUTIC EXERCISES: CPT | Mod: PN,CQ

## 2022-03-16 NOTE — PROGRESS NOTES
"[facility]   Physical Therapy Treatment Note     Name: Cesario Griffith  Clinic Number: [id number]    Therapy Diagnosis:   Encounter Diagnoses   Name Primary?    Left leg pain Yes    Decreased range of motion (ROM) of left knee     Gait abnormality     Weakness of left lower extremity      Physician: Schoen, Jonathan, MD     Visit Date: 3/16/2022    Physician Orders: PT Eval and Treat   Medical Diagnosis from Referral: Left leg pain  Evaluation Date: 2/28/2022  Authorization Period Expiration: 4/1/2022  Plan of Care Expiration: 4/25/2022  Progress Note Due: 3/28/2022  Visit # / Visits authorized: 2/ 8 (POC 1/12)   FOTO: 1/ 3         Time In: 1336  Time Out: 1431  Total Billable Time: 55 minutes    PTA Visit #: 1/5      Precautions: Standard    Insurance: Payor: MEDICAID / Plan: [email or web address] CONNECT / Product Type: Managed Medicaid /     SUBJECTIVE     Pt reports: "he stretched me pretty good last time and I am still feeling it."   He was not compliant with home exercise program.  Response to previous treatment: no change  Functional change: first visit since eval    Pain: 4/10  Location: left knee      OBJECTIVE     Objective Measures updated at progress report unless specified.     Treatment       Cesario received the treatments listed below:      THERAPEUTIC EXERCISES to develop strength, ROM and flexibility for 45 minutes including :   Long sitting Hamstring stretch 3 x 30 sec  Long sitting Gastroc-soleus stretch 3 x 30 sec  Prone hangs 4 min 5#  Quad sets with NMES 10 min  AASLR x 5 with NMES  SLR x 10 reps with NMES  Heel slides 3 x 10  Seated on the edge on the mat picking LLE up onto the mat and then back off without assistance x 10 reps (concentric hip flexion, adduction, abduction, eccentric hip flexion)    Not performed:  Adductor squeeze 3 x 10  Hook lying abduction 3 x 10  Side lying hip flexion x 10  Bridging 3 x 10    Manual Therapy  Distracted knee extension in " prone  Joint mobs in supine to improve knee extension and flexion grades III       Patient Education and Home Exercises     Home Exercises Provided and Patient Education Provided     Education provided:   - Continue HEP    Written Home Exercises Provided: Patient instructed to cont prior HEP. Exercises were reviewed and Cesario was able to demonstrate them prior to the end of the session.  Cesario demonstrated good  understanding of the education provided. See EMR under Patient Instructions for exercises provided during therapy sessions    ASSESSMENT     Attempted patellar mobs however hard end feel was palpated. Therapist was able to get his patellar to tilt slightly but no other movement was successful. He responded well to joint mobs with improvements verbalized post. Performed SLR's with NMES and was able to perform without therapist assistance but displayed greater quad lag. It was noticed that pt self assists LLE with performing transfers; therefore, provided education on the importance of attempting all t/fs without assisting LLE first. As a result, followed through with simulated car/bed transfer without assistance. Pt was successfully able to lift LLE onto mat and back off without using UE or RLE to aid. Pt will continue to benefit from skilled PT to improve LLE quad strength and ROM to allow pt to ambulate with proper gait mechanics.     Cesario Is progressing well towards his goals.   Pt prognosis is Good.     Pt will continue to benefit from skilled outpatient physical therapy to address the deficits listed in the problem list box on initial evaluation, provide pt/family education and to maximize pt's level of independence in the home and community environment.     Pt's spiritual, cultural and educational needs considered and pt agreeable to plan of care and goals.     Anticipated barriers to physical therapy: none    Goals:   Short Term Goals (STG) # weeks Goal Review Date Reviewed Date Met   1. The  patient will begin a written HEP 1 progressing 3/16/2022       2. Increase knee ROM to -10* - 100* 3 progressing 3/16/2022       3. Decrease soft tissue tenderness to mild 3 progressing 3/16/2022          Long Term Goals (LTG) # weeks Goal Review Date Reviewed Date Met   1. The patient will be independent with a HEP for maintenance 6 progressing 3/16/2022       2. Increase knee ROM to 0* to 115* 6 progressing 3/16/2022       3. Increase knee strength to 5/5 6 progressing 3/16/2022       4. The patient will ambulate on a community level without gait deviation & without AD . 6 progressing 3/16/2022       5. Decrease FOTO to < 36%.   6 progressing 3/16/2022               PLAN     Continue with physical therapy as per plan of care      Emy Lambert, PTA

## 2022-03-30 ENCOUNTER — CLINICAL SUPPORT (OUTPATIENT)
Dept: REHABILITATION | Facility: HOSPITAL | Age: 27
End: 2022-03-30
Payer: MEDICAID

## 2022-03-30 DIAGNOSIS — M79.605 LEFT LEG PAIN: Primary | ICD-10-CM

## 2022-03-30 DIAGNOSIS — R29.898 WEAKNESS OF LEFT LOWER EXTREMITY: ICD-10-CM

## 2022-03-30 DIAGNOSIS — M25.662 DECREASED RANGE OF MOTION (ROM) OF LEFT KNEE: ICD-10-CM

## 2022-03-30 DIAGNOSIS — R26.9 GAIT ABNORMALITY: ICD-10-CM

## 2022-03-30 PROCEDURE — 97110 THERAPEUTIC EXERCISES: CPT | Mod: PN | Performed by: PHYSICAL THERAPIST

## 2022-03-30 NOTE — PROGRESS NOTES
[facility]   Physical Therapy Treatment Note     Name: Cesario Grifftih  Clinic Number: [id number]    Therapy Diagnosis:   Encounter Diagnoses   Name Primary?    Left leg pain Yes    Decreased range of motion (ROM) of left knee     Gait abnormality     Weakness of left lower extremity      Physician: Schoen, Jonathan, MD     Visit Date: 3/30/2022    Physician Orders: PT Eval and Treat   Medical Diagnosis from Referral: Left leg pain  Evaluation Date: 2/28/2022  Authorization Period Expiration: 4/1/2022  Plan of Care Expiration: 4/25/2022  Progress Note Due: 3/28/2022  Visit # / Visits authorized: 3/ 8 (POC 3/12)   FOTO: 1/ 3         Time In: 1232  Time Out: 1335  Total Billable Time: 63 minutes    PTA Visit #: 1/5      Precautions: Standard    Insurance: Payor: MEDICAID / Plan: [place] [employer] CONNECT / Product Type: Managed Medicaid /     SUBJECTIVE     Pt reports: he has not attended physical therapy secondary to being out of town  He was not compliant with home exercise program.  Response to previous treatment: no change  Functional change: first visit since eval    Pain: 4/10  Location: left knee      OBJECTIVE     Left knee flexion: 86*     Treatment       Cesario received the treatments listed below:      THERAPEUTIC EXERCISES to develop strength, endurance, ROM and flexibility for 65 minutes including :    Patellar mobilizations  10 minutes   Superior/inferior    Medial/lateral  Long sitting Hamstring stretch 3 x 30 sec  Long sitting Gastroc-soleus stretch 3 x 30 sec  Prone hangs 4 min 5#  Ottoman stretch 4# 4 min  Quad sets with NMES 10 min  AASLR 3 x 10  long arc quads 3 x 10  Heel slides 3 x 10    Distracted knee flexion with strap 10 min    Shuttle leg press 3 x 10 25# (head at level 10) end range stretch in flexion  Stationary bike seat 4, 5 minutes (rocking)      Not performed:  Adductor squeeze 3 x 10  Hook lying abduction 3 x 10  Side lying hip flexion x 10  Bridging 3 x  10    Manual Therapy  Distracted knee extension in prone  Joint mobs in supine to improve knee extension and flexion grades III       Patient Education and Home Exercises     Home Exercises Provided and Patient Education Provided     Education provided:   - Continue HEP    Written Home Exercises Provided: Patient instructed to cont prior HEP. Exercises were reviewed and Cesario was able to demonstrate them prior to the end of the session.  Cesario demonstrated good  understanding of the education provided. See EMR under Patient Instructions for exercises provided during therapy sessions    ASSESSMENT     Patient presents to physical therapy ambulating with antalgic gait pattern. He continues to display limitation in left knee ROM. The patient was educated regarding use of fitness center in conjunction with physical therapy to maximize his outcome potential. Patient tolerated treatment well without report of adverse effects.     Cesario Is progressing well towards his goals.   Pt prognosis is Good.     Pt will continue to benefit from skilled outpatient physical therapy to address the deficits listed in the problem list box on initial evaluation, provide pt/family education and to maximize pt's level of independence in the home and community environment.     Pt's spiritual, cultural and educational needs considered and pt agreeable to plan of care and goals.     Anticipated barriers to physical therapy: none    Goals:   Short Term Goals (STG) # weeks Goal Review Date Reviewed Date Met   1. The patient will begin a written HEP 1 progressing 3/30/2022       2. Increase knee ROM to -10* - 100* 3 progressing 3/30/2022       3. Decrease soft tissue tenderness to mild 3 progressing 3/30/2022          Long Term Goals (LTG) # weeks Goal Review Date Reviewed Date Met   1. The patient will be independent with a HEP for maintenance 6 progressing 3/30/2022       2. Increase knee ROM to 0* to 115* 6 progressing 3/30/2022       3.  Increase knee strength to 5/5 6 progressing 3/30/2022       4. The patient will ambulate on a community level without gait deviation & without AD . 6 progressing 3/30/2022       5. Decrease FOTO to < 36%.   6 progressing 3/30/2022               PLAN     Continue with physical therapy as per plan of care      Duy Duffy, PT

## 2022-04-04 ENCOUNTER — CLINICAL SUPPORT (OUTPATIENT)
Dept: REHABILITATION | Facility: HOSPITAL | Age: 27
End: 2022-04-04
Payer: MEDICAID

## 2022-04-04 ENCOUNTER — DOCUMENTATION ONLY (OUTPATIENT)
Dept: REHABILITATION | Facility: HOSPITAL | Age: 27
End: 2022-04-04
Payer: MEDICAID

## 2022-04-04 DIAGNOSIS — R26.9 GAIT ABNORMALITY: ICD-10-CM

## 2022-04-04 DIAGNOSIS — R29.898 WEAKNESS OF LEFT LOWER EXTREMITY: ICD-10-CM

## 2022-04-04 DIAGNOSIS — M25.662 DECREASED RANGE OF MOTION (ROM) OF LEFT KNEE: ICD-10-CM

## 2022-04-04 DIAGNOSIS — M79.605 LEFT LEG PAIN: Primary | ICD-10-CM

## 2022-04-04 PROCEDURE — 97110 THERAPEUTIC EXERCISES: CPT | Mod: PN,CQ

## 2022-04-04 NOTE — PROGRESS NOTES
[facility]   Physical Therapy Treatment Note     Name: Cesario Griffith  Clinic Number: [id number]    Therapy Diagnosis:   Encounter Diagnoses   Name Primary?    Left leg pain Yes    Decreased range of motion (ROM) of left knee     Gait abnormality     Weakness of left lower extremity      Physician: Schoen, Jonathan, MD     Visit Date: 4/4/2022    Physician Orders: PT Eval and Treat   Medical Diagnosis from Referral: Left leg pain  Evaluation Date: 2/28/2022  Authorization Period Expiration: 4/1/2022  Plan of Care Expiration: 4/25/2022  Progress Note Due: 3/28/2022  Visit # / Visits authorized: 4/ 8 (POC 4/12)   FOTO: 1/ 3         Time In: 0855  Time Out: 1000  Total Billable Time: 50 minutes    PTA Visit #: 1/5      Precautions: Standard    Insurance: Payor: MEDICAID / Plan: [place] [employer] CONNE[place] / Product Type: Managed Medicaid /     SUBJECTIVE     Pt reports: He has been compliant with using his left lower extremity more versus helping his left lower extremity with his upper extremities.     He was not compliant with home exercise program.  Response to previous treatment: no change  Functional change: first visit since eval    Pain: 4/10  Location: left knee      OBJECTIVE     Left knee flexion: 86*     Treatment       Cesario received the treatments listed below:      THERAPEUTIC EXERCISES to develop strength, endurance, ROM and flexibility for 65 minutes including :    Patellar mobilizations  10 minutes   Superior/inferior    Medial/lateral  Long sitting Hamstring stretch 3 x 30 sec  Long sitting Gastroc-soleus stretch 3 x 30 sec  Prone hangs 4 min 5#  Ottoman stretch 4# 4 min  Quad sets with NMES 10 min  AASLR 3 x 10  long arc quads 3 x 10  Heel slides 3 x 10    Distracted knee flexion with strap 10 minutes with soft tissue mobilization to distal quad and superior patellar region    Shuttle leg press 3 x 10 25# (head at level 10) end range stretch in flexion  Stationary bike seat  4, 5 minutes (rocking)      Not performed:  Adductor squeeze 3 x 10  Hook lying abduction 3 x 10  Side lying hip flexion x 10  Bridging 3 x 10    Manual Therapy  Distracted knee extension in prone  Joint mobs in supine to improve knee extension and flexion grades III       Patient Education and Home Exercises     Home Exercises Provided and Patient Education Provided     Education provided:   - Continue HEP    Written Home Exercises Provided: Patient instructed to cont prior HEP. Exercises were reviewed and Cesario was able to demonstrate them prior to the end of the session.  Cesario demonstrated good  understanding of the education provided. See EMR under Patient Instructions for exercises provided during therapy sessions    ASSESSMENT     Patient responded well to manual therapy as minimal movement was achieved during patellar mobs as compared to no movement during previous treatment sessions. . He struggles with transitioning between flexion and extension exercises due to reports of superior and interior patellar pain. Patient will continue to benefit from skilled Physical Therapy to improve strength and range of motion deficits to allow patient to ambulate with improved gait mechanics.      Cesario Is progressing well towards his goals.   Pt prognosis is Good.     Pt will continue to benefit from skilled outpatient physical therapy to address the deficits listed in the problem list box on initial evaluation, provide pt/family education and to maximize pt's level of independence in the home and community environment.     Pt's spiritual, cultural and educational needs considered and pt agreeable to plan of care and goals.     Anticipated barriers to physical therapy: none    Goals:   Short Term Goals (STG) # weeks Goal Review Date Reviewed Date Met   1. The patient will begin a written HEP 1 progressing 4/4/2022       2. Increase knee ROM to -10* - 100* 3 progressing 4/4/2022       3. Decrease soft tissue tenderness  to mild 3 progressing 4/4/2022          Long Term Goals (LTG) # weeks Goal Review Date Reviewed Date Met   1. The patient will be independent with a HEP for maintenance 6 progressing 4/4/2022       2. Increase knee ROM to 0* to 115* 6 progressing 4/4/2022       3. Increase knee strength to 5/5 6 progressing 4/4/2022       4. The patient will ambulate on a community level without gait deviation & without AD . 6 progressing 4/4/2022       5. Decrease FOTO to < 36%.   6 progressing 4/4/2022               PLAN     Continue with physical therapy as per plan of care      Emy Lambert, PTA

## 2022-10-05 DIAGNOSIS — M25.562 PAIN IN LEFT KNEE: Primary | ICD-10-CM

## 2023-01-09 ENCOUNTER — HOSPITAL ENCOUNTER (EMERGENCY)
Facility: HOSPITAL | Age: 28
Discharge: HOME | End: 2023-01-09
Attending: EMERGENCY MEDICINE
Payer: MEDICAID

## 2023-01-09 VITALS
HEART RATE: 60 BPM | DIASTOLIC BLOOD PRESSURE: 70 MMHG | OXYGEN SATURATION: 100 % | TEMPERATURE: 98 F | WEIGHT: 220 LBS | BODY MASS INDEX: 33.45 KG/M2 | SYSTOLIC BLOOD PRESSURE: 130 MMHG | RESPIRATION RATE: 16 BRPM

## 2023-01-09 DIAGNOSIS — R07.89 ATYPICAL CHEST PAIN: Primary | ICD-10-CM

## 2023-01-09 LAB
ALBUMIN SERPL BCP-MCNC: 4.8 G/DL (ref 3.5–5.2)
ALP SERPL-CCNC: 54 U/L (ref 55–135)
ALT SERPL W/O P-5'-P-CCNC: 29 U/L (ref 10–44)
AMPHET+METHAMPHET UR QL: NEGATIVE
ANION GAP SERPL CALC-SCNC: 9 MMOL/L (ref 8–16)
AST SERPL-CCNC: 24 U/L (ref 10–40)
BARBITURATES UR QL SCN>200 NG/ML: NEGATIVE
BASOPHILS # BLD AUTO: 0.05 K/UL (ref 0–0.2)
BASOPHILS NFR BLD: 0.7 % (ref 0–1.9)
BENZODIAZ UR QL SCN>200 NG/ML: NEGATIVE
BILIRUB SERPL-MCNC: 1.1 MG/DL (ref 0.1–1)
BILIRUB UR QL STRIP: NEGATIVE
BNP SERPL-MCNC: 7 PG/ML (ref 0–99)
BNP SERPL-MCNC: 7 PG/ML (ref 0–99)
BUN SERPL-MCNC: 19 MG/DL (ref 6–20)
BZE UR QL SCN: NEGATIVE
CALCIUM SERPL-MCNC: 9.9 MG/DL (ref 8.7–10.5)
CANNABINOIDS UR QL SCN: NEGATIVE
CHLORIDE SERPL-SCNC: 104 MMOL/L (ref 95–110)
CK SERPL-CCNC: 253 U/L (ref 20–200)
CLARITY UR: CLEAR
CO2 SERPL-SCNC: 30 MMOL/L (ref 23–29)
COLOR UR: YELLOW
CREAT SERPL-MCNC: 1.1 MG/DL (ref 0.5–1.4)
CREAT UR-MCNC: 514 MG/DL (ref 23–375)
DIFFERENTIAL METHOD: ABNORMAL
EOSINOPHIL # BLD AUTO: 0.2 K/UL (ref 0–0.5)
EOSINOPHIL NFR BLD: 2.5 % (ref 0–8)
ERYTHROCYTE [DISTWIDTH] IN BLOOD BY AUTOMATED COUNT: 15.2 % (ref 11.5–14.5)
EST. GFR  (NO RACE VARIABLE): >60 ML/MIN/1.73 M^2
GLUCOSE SERPL-MCNC: 91 MG/DL (ref 70–110)
GLUCOSE UR QL STRIP: NEGATIVE
HCT VFR BLD AUTO: 46.9 % (ref 40–54)
HGB BLD-MCNC: 14.9 G/DL (ref 14–18)
HGB UR QL STRIP: NEGATIVE
IMM GRANULOCYTES # BLD AUTO: 0.02 K/UL (ref 0–0.04)
IMM GRANULOCYTES NFR BLD AUTO: 0.3 % (ref 0–0.5)
INR PPP: 1.1 (ref 0.8–1.2)
KETONES UR QL STRIP: ABNORMAL
LEUKOCYTE ESTERASE UR QL STRIP: NEGATIVE
LIPASE SERPL-CCNC: 25 U/L (ref 4–60)
LYMPHOCYTES # BLD AUTO: 3.4 K/UL (ref 1–4.8)
LYMPHOCYTES NFR BLD: 44.7 % (ref 18–48)
MAGNESIUM SERPL-MCNC: 1.9 MG/DL (ref 1.6–2.6)
MCH RBC QN AUTO: 27.1 PG (ref 27–31)
MCHC RBC AUTO-ENTMCNC: 31.8 G/DL (ref 32–36)
MCV RBC AUTO: 85 FL (ref 82–98)
MONOCYTES # BLD AUTO: 0.6 K/UL (ref 0.3–1)
MONOCYTES NFR BLD: 8.3 % (ref 4–15)
NEUTROPHILS # BLD AUTO: 3.3 K/UL (ref 1.8–7.7)
NEUTROPHILS NFR BLD: 43.5 % (ref 38–73)
NITRITE UR QL STRIP: NEGATIVE
NRBC BLD-RTO: 0 /100 WBC
OPIATES UR QL SCN: NEGATIVE
PCP UR QL SCN>25 NG/ML: NEGATIVE
PH UR STRIP: 6 [PH] (ref 5–8)
PLATELET # BLD AUTO: 350 K/UL (ref 150–450)
PMV BLD AUTO: 8.8 FL (ref 9.2–12.9)
POTASSIUM SERPL-SCNC: 3.6 MMOL/L (ref 3.5–5.1)
PROT SERPL-MCNC: 8.4 G/DL (ref 6–8.4)
PROT UR QL STRIP: ABNORMAL
PROTHROMBIN TIME: 11 SEC (ref 9–12.5)
RBC # BLD AUTO: 5.5 M/UL (ref 4.6–6.2)
SARS-COV-2 RDRP RESP QL NAA+PROBE: NEGATIVE
SODIUM SERPL-SCNC: 143 MMOL/L (ref 136–145)
SP GR UR STRIP: >1.03 (ref 1–1.03)
TOXICOLOGY INFORMATION: ABNORMAL
TROPONIN I SERPL HS-MCNC: 4.7 PG/ML (ref 0–14.9)
TROPONIN I SERPL HS-MCNC: 5.7 PG/ML (ref 0–14.9)
TROPONIN I SERPL HS-MCNC: 5.7 PG/ML (ref 0–14.9)
URN SPEC COLLECT METH UR: ABNORMAL
UROBILINOGEN UR STRIP-ACNC: NEGATIVE EU/DL
WBC # BLD AUTO: 7.56 K/UL (ref 3.9–12.7)

## 2023-01-09 PROCEDURE — 81003 URINALYSIS AUTO W/O SCOPE: CPT | Mod: 59 | Performed by: EMERGENCY MEDICINE

## 2023-01-09 PROCEDURE — 83735 ASSAY OF MAGNESIUM: CPT | Performed by: EMERGENCY MEDICINE

## 2023-01-09 PROCEDURE — 82550 ASSAY OF CK (CPK): CPT | Performed by: EMERGENCY MEDICINE

## 2023-01-09 PROCEDURE — U0002 COVID-19 LAB TEST NON-CDC: HCPCS | Performed by: EMERGENCY MEDICINE

## 2023-01-09 PROCEDURE — 99285 EMERGENCY DEPT VISIT HI MDM: CPT | Mod: 25

## 2023-01-09 PROCEDURE — 84484 ASSAY OF TROPONIN QUANT: CPT | Mod: 91 | Performed by: EMERGENCY MEDICINE

## 2023-01-09 PROCEDURE — 93005 ELECTROCARDIOGRAM TRACING: CPT | Performed by: INTERNAL MEDICINE

## 2023-01-09 PROCEDURE — 80307 DRUG TEST PRSMV CHEM ANLYZR: CPT | Performed by: EMERGENCY MEDICINE

## 2023-01-09 PROCEDURE — 83690 ASSAY OF LIPASE: CPT | Performed by: EMERGENCY MEDICINE

## 2023-01-09 PROCEDURE — 80053 COMPREHEN METABOLIC PANEL: CPT | Performed by: EMERGENCY MEDICINE

## 2023-01-09 PROCEDURE — 85025 COMPLETE CBC W/AUTO DIFF WBC: CPT | Performed by: EMERGENCY MEDICINE

## 2023-01-09 PROCEDURE — 93010 ELECTROCARDIOGRAM REPORT: CPT | Mod: 76,,, | Performed by: INTERNAL MEDICINE

## 2023-01-09 PROCEDURE — 85610 PROTHROMBIN TIME: CPT | Performed by: EMERGENCY MEDICINE

## 2023-01-09 PROCEDURE — 25500020 PHARM REV CODE 255: Performed by: EMERGENCY MEDICINE

## 2023-01-09 PROCEDURE — 83880 ASSAY OF NATRIURETIC PEPTIDE: CPT | Performed by: EMERGENCY MEDICINE

## 2023-01-09 PROCEDURE — 93010 EKG 12-LEAD: ICD-10-PCS | Mod: 76,,, | Performed by: INTERNAL MEDICINE

## 2023-01-09 RX ORDER — FLUOXETINE 10 MG/1
1 CAPSULE ORAL 2 TIMES DAILY
COMMUNITY
Start: 2022-10-04

## 2023-01-09 RX ORDER — IBUPROFEN 800 MG/1
800 TABLET ORAL 3 TIMES DAILY PRN
COMMUNITY
Start: 2022-10-04

## 2023-01-09 RX ORDER — CLONAZEPAM 0.5 MG/1
0.5 TABLET ORAL DAILY
COMMUNITY
Start: 2022-10-04

## 2023-01-09 RX ORDER — FAMOTIDINE 20 MG/1
20 TABLET, FILM COATED ORAL 2 TIMES DAILY PRN
COMMUNITY
Start: 2022-10-04

## 2023-01-09 RX ORDER — MULTIVITAMIN
1 TABLET ORAL DAILY
COMMUNITY

## 2023-01-09 RX ORDER — ALBUTEROL SULFATE 90 UG/1
2 AEROSOL, METERED RESPIRATORY (INHALATION)
COMMUNITY
Start: 2022-10-04

## 2023-01-09 RX ORDER — MELOXICAM 7.5 MG/1
7.5 TABLET ORAL DAILY PRN
COMMUNITY
Start: 2022-10-04

## 2023-01-09 RX ADMIN — IOHEXOL 100 ML: 350 INJECTION, SOLUTION INTRAVENOUS at 12:01

## 2023-01-09 NOTE — FIRST PROVIDER EVALUATION
Medical screening examination initiated.  I have conducted a focused provider triage encounter, findings are as follows:    Brief history of present illness: chest tightness    Vitals:    01/09/23 0850   BP: (!) 148/101   Pulse: 84   Resp: 18   Temp: 97.8 °F (36.6 °C)   TempSrc: Oral   SpO2: 97%   Weight: 99.8 kg (220 lb)       Pertinent physical exam:  Reports chest tightness that is intermittent.  Patient states started yesterday after eating food denies abdominal pain nausea or vomiting    Brief workup plan: labs ekg     Preliminary workup initiated; this workup will be continued and followed by the physician or advanced practice provider that is assigned to the patient when roomed.

## 2023-01-09 NOTE — ED TRIAGE NOTES
"Pt c/o left upper pectoris chest pain with tightness to LUE x 1 day reports ate fast food and pain started subsequent to that denies any n/v/diaphoresis/swelling + " a little SOA" denies any cough/fevers/chills; denies any cardiac hx  "

## 2023-01-09 NOTE — ED PROVIDER NOTES
Encounter Date: 1/9/2023       History     Chief Complaint   Patient presents with    Chest Pain     Patient presents complaining of intermittent left-sided chest discomfort with pain in the left arm that has been ongoing for the last 3 days.  Patient states he initially noticed the pain and discomfort while driving to [place].  Patient has history of bone cancer in the left knee and is status post knee replacement over 10 years ago.  Patient denies pleuritic pain.  He denies shortness of breath.  At the worst symptoms are mild-to-moderate.    Review of patient's allergies indicates:   Allergen Reactions    Penicillins      No past medical history on file.  No past surgical history on file.  No family history on file.     Review of Systems   All other systems reviewed and are negative.    Physical Exam     Initial Vitals [01/09/23 0850]   BP Pulse Resp Temp SpO2   (!) 148/101 84 18 97.8 °F (36.6 °C) 97 %      MAP       --         Physical Exam    Nursing note and vitals reviewed.  Constitutional: He appears well-developed and well-nourished. He is not diaphoretic. No distress.   Pleasant, polite.   HENT:   Head: Normocephalic and atraumatic.   Eyes: EOM are normal.   Neck: Neck supple.   Normal range of motion.  Cardiovascular:  Normal rate, regular rhythm, normal heart sounds and intact distal pulses.           Pulmonary/Chest: Breath sounds normal. No respiratory distress.   Abdominal: Abdomen is soft.   Musculoskeletal:         General: Normal range of motion.      Cervical back: Normal range of motion and neck supple.     Neurological: He is alert and oriented to person, place, and time. He has normal strength.   Skin: Skin is warm and dry.   Psychiatric: He has a normal mood and affect. His behavior is normal. Judgment and thought content normal.       ED Course   Procedures  Labs Reviewed   CBC W/ AUTO DIFFERENTIAL - Abnormal; Notable for the following components:       Result Value    MCHC 31.8 (*)     RDW  15.2 (*)     MPV 8.8 (*)     All other components within normal limits   COMPREHENSIVE METABOLIC PANEL - Abnormal; Notable for the following components:    CO2 30 (*)     Total Bilirubin 1.1 (*)     Alkaline Phosphatase 54 (*)     All other components within normal limits   CK - Abnormal; Notable for the following components:     (*)     All other components within normal limits   B-TYPE NATRIURETIC PEPTIDE   TROPONIN I HIGH SENSITIVITY   PROTIME-INR   MAGNESIUM   TROPONIN I HIGH SENSITIVITY   B-TYPE NATRIURETIC PEPTIDE   SARS-COV-2 RNA AMPLIFICATION, QUAL   LIPASE   TROPONIN I HIGH SENSITIVITY   DRUG SCREEN PANEL, URINE EMERGENCY   URINALYSIS        ECG Results              EKG 12-lead (In process)  Result time 01/09/23 09:30:50      In process by Interface, Lab In [facility] (01/09/23 09:30:50)                   Narrative:    Test Reason : R07.9,    Vent. Rate : 089 BPM     Atrial Rate : 089 BPM     P-R Int : 158 ms          QRS Dur : 088 ms      QT Int : 344 ms       P-R-T Axes : 080 067 040 degrees     QTc Int : 418 ms    Normal sinus rhythm with sinus arrhythmia  Nonspecific ST and T wave abnormality  Abnormal ECG  When compared with ECG of 09-JAN-2023 08:36,  ST elevation now present in Anterior leads  Nonspecific T wave abnormality has replaced inverted T waves in Inferior  leads  Nonspecific T wave abnormality has replaced inverted T waves in Lateral  leads    Referred By: [email or web address]   SELF           Confirmed By:                                      EKG 12-lead (In process)  Result time 01/09/23 09:30:35      In process by Interface, Lab In [facility] (01/09/23 09:30:35)                   Narrative:    Test Reason : R07.9,    Vent. Rate : 108 BPM     Atrial Rate : 108 BPM     P-R Int : 150 ms          QRS Dur : 074 ms      QT Int : 312 ms       P-R-T Axes : 086 084 -01 degrees     QTc Int : 418 ms    Sinus tachycardia  T wave abnormality, consider inferolateral ischemia  Abnormal ECG  No previous ECGs  available    Referred By: [email or web address]   SELF           Confirmed By:                                   Imaging Results              CTA Chest Non-Coronary (PE Studies) (Final result)  Result time 01/09/23 12:16:39      Final result by Raciel Finn MD (01/09/23 12:16:39)                   Narrative:    CMS MANDATED QUALITY DATA - CT RADIATION - 436    All CT scans at this facility utilize dose modulation, iterative reconstruction, and/or weight based dosing when appropriate to reduce radiation dose to as low as reasonably achievable.        Reason: Pulmonary embolism (PE) suspected, high prob    TECHNIQUE: CT angiography of thorax with 100 mL Omnipaque 350.  Maximum intensity projection coronal reformations were created at a separate workstation and stored in the patient's permanent medical record.    COMPARISON: None    CTA THORAX:  No pulmonary arterial filling defect is present to suggest pulmonary embolus. Very mild repetitive motion does slightly degrade image quality. Thoracic aorta is of normal caliber and without evidence of dissection.    Lungs are clear with staples in right lower lobe laterally suggesting prior wedge resection. Trachea and main bronchi are patent. No pleural or pericardial effusion. No enlarged hilar or mediastinal lymph nodes. Residual thymus occupies the anterior mediastinum. Mild bilateral gynecomastia is present.    Partially visualized upper abdomen is unremarkable.    No acute osseous abnormality.    IMPRESSION:  No evidence of pulmonary embolus or other acute intrathoracic abnormality.    Electronically signed by:  Raciel Finn MD  1/9/2023 12:16 PM [facility] Workstation: [id number]                                     X-Ray Chest PA And Lateral (Final result)  Result time 01/09/23 09:22:31      Final result by Noé Marin MD (01/09/23 09:22:31)                   Narrative:    HISTORY: Chest Pain    FINDINGS: PA and lateral chest radiograph at 0904 hours with no prior studies for  comparison show the trachea is midline, with the cardiomediastinal silhouette and pulmonary vascular distribution within normal limits.    The lungs are normally and symmetrically expanded, with no consolidation, pleural effusion or evidence of pulmonary edema. No confluent infiltrates or pneumothorax. There are no significant osseous abnormalities.    IMPRESSION: No evidence of active cardiopulmonary disease.    Electronically signed by:  Noé Marin MD  1/9/2023 9:22 AM CST Workstation: [id number]                                     Medications   iohexoL (OMNIPAQUE 350) injection 100 mL (100 mLs Intravenous Given 1/9/23 1202)     Medical Decision Making:   Initial Assessment:   No apparent distress  Differential Diagnosis:   Considerations include but are not limited to acute kidney injury, dehydration, acute coronary syndrome, pulmonary embolism, musculoskeletal pain  Clinical Tests:   Lab Tests: Reviewed and Ordered  Radiological Study: Ordered and Reviewed  Medical Tests: Reviewed and Ordered  ED Management:  [facility]    Patient presents for emergent evaluation of acute chest pain that poses a threat to life and/or bodily function.    In the ED patient found to have acute atypical chest pain.    I ordered labs and personally reviewed them.  Labs significant for high sensitivity troponin 4.7  I ordered X-rays and personally reviewed them and reviewed the radiologist interpretation.  Xray significant for no acute cardiopulmonary process  I ordered EKG and personally reviewed it.  EKG significant for regular rate and rhythm without ST elevation x2 .    I ordered CT scan and personally reviewed it and reviewed the radiologist interpretation.  CT significant for no acute cardiopulmonary process, no pulmonary embolism.      Discharge [facility]    In the emergency department patient is having no active or ongoing chest pain.  Patient is young without risk factors and has a negative CT of the chest and negative high sensitivity  troponin x2.  Patient was reassured advised likely musculoskeletal in nature.  He is advised Tylenol and ibuprofen for pain.  Patient was discharged in stable condition.  Detailed return precautions discussed.              ED Course as of 01/09/23 1233   Mon Jan 09, 2023   1228 WBC: 7.56 [AP]   1228 Hemoglobin: 14.9 [AP]   1228 Hematocrit: 46.9 [AP]   1228 Platelets: 350 [AP]   1228 Sodium: 143 [AP]   1228 Potassium: 3.6 [AP]   1228 Chloride: 104 [AP]   1228 CO2(!): 30 [AP]   1228 Glucose: 91 [AP]   1228 BUN: 19 [AP]   1228 Creatinine: 1.1 [AP]   1228 Calcium: 9.9 [AP]   1228 PROTEIN TOTAL: 8.4 [AP]   1228 Albumin: 4.8 [AP]   1228 CPK(!): 253 [AP]   1228 Lipase: 25 [AP]   1228 BNP: 7 [AP]   1228 Troponin I High Sensitivity: 4.7 [AP]      ED Course User Index  [AP] Edu Priest MD                 Clinical Impression:   Final diagnoses:  [R07.89] Atypical chest pain (Primary)        ED Disposition Condition    Discharge Stable          ED Prescriptions    None       Follow-up Information       Follow up With Specialties Details Why Contact [facility]  In 1 week  [facility] [address]  [facility] [address]  [phone]               Edu Priest MD  01/09/23 1236

## 2023-05-03 DIAGNOSIS — M25.562 LEFT KNEE PAIN: Primary | ICD-10-CM

## 2023-05-10 ENCOUNTER — CLINICAL SUPPORT (OUTPATIENT)
Dept: REHABILITATION | Facility: HOSPITAL | Age: 28
End: 2023-05-10
Payer: MEDICAID

## 2023-05-10 DIAGNOSIS — R29.898 WEAKNESS OF LEFT LOWER EXTREMITY: ICD-10-CM

## 2023-05-10 DIAGNOSIS — R26.9 GAIT ABNORMALITY: ICD-10-CM

## 2023-05-10 DIAGNOSIS — M25.662 DECREASED RANGE OF MOTION (ROM) OF LEFT KNEE: Primary | ICD-10-CM

## 2023-05-10 PROCEDURE — 97161 PT EVAL LOW COMPLEX 20 MIN: CPT | Mod: PN

## 2023-05-15 ENCOUNTER — CLINICAL SUPPORT (OUTPATIENT)
Dept: REHABILITATION | Facility: HOSPITAL | Age: 28
End: 2023-05-15
Payer: MEDICAID

## 2023-05-15 DIAGNOSIS — R29.898 WEAKNESS OF LEFT LOWER EXTREMITY: Primary | ICD-10-CM

## 2023-05-15 DIAGNOSIS — G89.29 CHRONIC PAIN OF LEFT KNEE: ICD-10-CM

## 2023-05-15 DIAGNOSIS — M25.662 DECREASED RANGE OF MOTION (ROM) OF LEFT KNEE: ICD-10-CM

## 2023-05-15 DIAGNOSIS — M25.562 CHRONIC PAIN OF LEFT KNEE: ICD-10-CM

## 2023-05-15 DIAGNOSIS — R26.9 GAIT ABNORMALITY: ICD-10-CM

## 2023-05-15 PROCEDURE — 97110 THERAPEUTIC EXERCISES: CPT | Mod: PN,CQ

## 2023-05-15 NOTE — PROGRESS NOTES
[facility]   Physical Therapy Treatment Note      Name: Cesario Griffith  Clinic Number: [id number]    Therapy Diagnosis:   Encounter Diagnoses   Name Primary?    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  He was compliant with home exercise program.  Response to previous treatment: first visit post initial evaluation   Functional change: non-remarkable     Pain: 3/10  Location: left knee      Objective      Objective Measures updated at progress report unless specified.     Treatment     Physical Therapist assisted with treatment for 30 minutes.      Cesario received the treatments listed below:      therapeutic exercises to develop strength, endurance, ROM, and flexibility for 55 minutes including:    Heel prop 5 pounds above and below joint line x 5 minutes     Measurements taken 15 cm proximal  48 cm left quad  67 cm right quad    BFR:   144 mmHg   mmHg  Following exercises: reps 30, 15, 15,15 - 30 sec breaks in between each set   2 minute break between     Quad sets   Seated LAQ in available range  Single leg shuttle 25 pounds for 30 repetitions then reduced down to 12 pounds for the remainder repetitions     Patient Education and Home Exercises        Education provided:   - Home Exercise Program   -BFR expectations    Written Home Exercises Provided: yes. Exercises were reviewed and Cesario was able to demonstrate them prior to the end of the session.  Cesario demonstrated good  understanding of the education provided. See EMR under Patient Instructions for exercises provided during therapy sessions    Assessment     Utilized BFR to improve quad strength. He was provided education on BFR and was able to verbalize understanding. Struggled with single leg shuttle and first the first set had to reduce weight. Will continue to benefit from continued BFR training to maximize strength gains.     Cesario Is progressing well towards his goals.   Pt prognosis is Fair.     Pt will continue to benefit from skilled outpatient physical therapy to address the deficits listed in the problem list box on initial evaluation, provide pt/family education and to maximize pt's level of independence in the home and community environment.     Pt's spiritual, cultural and educational needs considered and pt agreeable to plan of care and goals.     Anticipated barriers to physical therapy: chronicity of condition    Goals:     Short Term Goals: 4 weeks  In progress 5/15/2023  Pt will be IND with initial HEP to manage symptoms outside of PT.   Pt will report Left knee pain improved by >/= 25% with ambulation to demonstrate improved condition.   Pt will improve MMT of Left Lower Extremity by >/= 1/5 to improve tolerance for progressing rehab.   Pt will improve Left knee ROM by >= 5 degrees to improve tolerance for ambulation.      Long Term Goals: 10-12 weeks   Pt will improve FOTO score to </= 39% limited to demonstrate improved functional mobility.  Pt will be IND with final HEP to maintain/improve strength and mobility gained in PT.   Pt will report Left knee pain improved by >/= 50% with ambulation to demonstrate improved condition.   Pt will improve MMT of LLE to >/= 4-/ 5 to improve  tolerance for household chores and lifting/carrying activities.   Pt will improve Left knee ROM by >/= 10 degrees to improve tolerance for ambulation.   Pt goal: Pt will report ability to return to gym and confidence in managing his condition upon discharge from PT.     Plan     Progress left quad strength and left knee range of motion as able.     Emy Lambert, PTA

## 2023-05-15 NOTE — PLAN OF CARE
[facility]ER[facility] AND [facility]   Physical Therapy Initial Evaluation     Date: 5/10/2023   Name: Cesario Griffith  Clinic Number: [id number]    Therapy Diagnosis:   Encounter Diagnoses   Name Primary?    Decreased range of motion (ROM) of left knee Yes    Weakness of left lower extremity     Gait abnormality      Physician: Order, Paper    Physician Orders: PT Eval and Treat   Medical Diagnosis from Referral: M25.562 (ICD-10-CM) - Left knee pain  Evaluation Date: 5/10/2023  Authorization Period Expiration: 12/31/2023  Plan of Care Expiration: 08/10/2023  Progress Note Due: 06/10/2023  Visit # / Visits authorized: 1/ 1   FOTO: 1/ 3     Return to MD date: prn    Precautions: Standard and hx of Left total knee arthroplasty secondary to cancer     Time In: 1405  Time Out: 1445  Total Appointment Time (timed & untimed codes): 40 minutes      SUBJECTIVE   Date of onset: 2012    History of current condition - Cesario reports: he had bone cancer when he was young, requiring him to have a total knee replacement. This was in 2012. He does not have active cancer, that was the only surgery he required. He didn't take therapy seriously back then and he has been trying to get his leg right since. He is losing muscle in his Left leg. His goal is to get stronger and improve his quad muscle girth comparable to other side if possible. His car has given him trouble in the past which has made transportation to physical therapy difficult. Now he has transportation figured out and will be able to make it.    Falls: no    Imaging, none    Prior Therapy: yes  Social History:  lives alone, 3 steps to enter, holds on to railing  Occupation: not working right now   Prior Level of Function: Chronic Left knee pain and Left Lower Extremity weakness  Current Level of Function: difficulty ambulating, weight bearing    Pain:  Current 3/10, worst 8/10, best 3/10   Location: left knee    Description: Aching  Aggravating Factors: walking,  standing, squatting,   Easing Factors: pain relieving gel     Patients goals: To get stronger, decrease pain, improve his range of motion.. Improve gait pattern     Medical History:   No past medical history on file.    Surgical History:   Cesario Griffith  has no past surgical history on file.    Medications:   Cesario has a current medication list which includes the following prescription(s): albuterol, clonazepam, famotidine, fluoxetine, ibuprofen, meloxicam, and multivitamin.    Allergies:   Review of patient's allergies indicates:   Allergen Reactions    Penicillins           OBJECTIVE     Observation: Pt ambulates with decreased Left knee extension during stance and decreased knee flexion during swing. Decreased time in stance on Left Lower Extremity.     Posture: holds Left knee in flexion     Functional tests:     SLS EO: unable to balance without Upper extremity support secondary to inability to achieve full knee extension      Range of Motion (Passive):   Knee  Comments   Left 0/15/70    RIght 0/0/130      Range of Motion (Active):   Knee  Comments   Left 0/15/70    Right 0/0/130        Lower Extremity Strength  Right LE  Left LE    Quadriceps: 5/5 Quadriceps: 2/5   Hamstrings: 5/5 Hamstrings: 2/5   Hip flexion (supine): 5/5 Hip flexion (supine): 2/5   Hip extension:  4/5 Hip extension: 2/5   Hip abduction:  4/5 Hip abduction 2/5   Hip ER:  5/5 Hip ER:  3/5   Hip IR: 5/5 Hip IR: 3/5     Joint Mobility: limited tibiofemoral mobility in both flexion and extensino         CMS Impairment/Limitation/Restriction for FOTO Knee Survey  Status Limitation G-Code CMS Severity Modifier  Intake 38% 62% Current Status CL - At least 60 percent but less than 80 percent  Predicted 61% 39% Goal Status+ CJ - At least 20 percent but less than 40 percent         TREATMENT     Total Treatment time (time-based codes) separate from Evaluation: 10 minutes      Cesario received the treatments listed below:      therapeutic  exercises to develop strength, Range of Motion, and endurance for 10 minutes including:    PT educated pt on condition, prognosis, and plan of care.     PT educated pt on and provided pt HEP consisting of:     Terminal knee extension heel prop x5mins  Quad sets   Long arc quad  90-40    PATIENT EDUCATION AND HOME EXERCISES     Education provided:   - Home Exercise Program to be performed twice daily     Written Home Exercises Provided: yes. Exercises were reviewed and Cesario was able to demonstrate them prior to the end of the session.  Cesario demonstrated good  understanding of the education provided. See EMR under Patient Instructions for exercises provided during therapy sessions.    ASSESSMENT     Cesario is a 27 y.o. male referred to outpatient Physical Therapy with a medical diagnosis of Left knee pain. Patient presents with complaints of chronic Left knee pain since having  a Left total knee arthroplasty secondary to bone cancer in 2012. He demonstrates significant limitations in Left knee Range of Motion as well as strength. His symptoms and deficits are limiting his ability to perform activities of daily living, ambulation, household chores, lifting/carrying activities, and recreation/leisure activities.     Patient prognosis is Fair.   Patientt will benefit from skilled outpatient Physical Therapy to address the deficits stated above and in the chart below, provide patient /family education, and to maximize patientt's level of independence.     Plan of care discussed with patient: Yes  Patient's spiritual, cultural and educational needs considered and patient is agreeable to the plan of care and goals as stated below:     Anticipated Barriers for therapy: chronicity of condition     Medical Necessity is demonstrated by the following  History  Co-morbidities and personal factors that may impact the plan of care Co-morbidities:   Hx of Left total knee arthroplasty     Personal Factors:   no deficits     low    Examination  Body Structures and Functions, activity limitations and participation restrictions that may impact the plan of care Body Regions:   lower extremities    Body Systems:    ROM  strength  balance  gait  motor control    Participation Restrictions:   activities of daily living, ambulation, household chores, lifting/carrying activities, and recreation/leisure activities    Activity limitations:   Learning and applying knowledge  no deficits    General Tasks and Commands  no deficits    Communication  no deficits    Mobility  lifting and carrying objects  walking    Self care  dressing    Domestic Life  shopping  cooking  doing house work (cleaning house, washing dishes, laundry)    Interactions/Relationships  no deficits    Life Areas  no deficits    Community and Social Life  community life  recreation and leisure         moderate   Clinical Presentation evolving clinical presentation with changing clinical characteristics moderate   Decision Making/ Complexity Score: low     Goals:  Short Term Goals: 4 weeks   Pt will be IND with initial HEP to manage symptoms outside of PT.   Pt will report Left knee pain improved by >/= 25% with ambulation to demonstrate improved condition.   Pt will improve MMT of Left Lower Extremity by >/= 1/5 to improve tolerance for progressing rehab.   Pt will improve Left knee ROM by >= 5 degrees to improve tolerance for ambulation.     Long Term Goals: 10-12 weeks   Pt will improve FOTO score to </= 39% limited to demonstrate improved functional mobility.  Pt will be IND with final HEP to maintain/improve strength and mobility gained in PT.   Pt will report Left knee pain improved by >/= 50% with ambulation to demonstrate improved condition.   Pt will improve MMT of LLE to >/= 4-/ 5 to improve tolerance for household chores and lifting/carrying activities.   Pt will improve Left knee ROM by >/= 10 degrees to improve tolerance for ambulation.   Pt goal: Pt will report ability to  return to gym and confidence in managing his condition upon discharge from PT.       PLAN   Plan of care Certification: 5/10/2023 to 08/10/2023.    Outpatient Physical Therapy 1-2 times weekly for 12 weeks to include the following interventions: Electrical Stimulation for quad activation, Gait Training, Manual Therapy, Moist Heat/ Ice, Patient Education, Therapeutic Activities, Therapeutic Exercise, and Bloodflow Restriction Training.     Oscar Powers, PT  Board Certified Clinical Specialist in Orthopedic Physical Therapy      I CERTIFY THE NEED FOR THESE SERVICES FURNISHED UNDER THIS PLAN OF TREATMENT AND WHILE UNDER MY CARE   Physician's comments:     Physician's Signature: ___________________________________________________

## 2023-05-17 ENCOUNTER — CLINICAL SUPPORT (OUTPATIENT)
Dept: REHABILITATION | Facility: HOSPITAL | Age: 28
End: 2023-05-17
Payer: MEDICAID

## 2023-05-17 DIAGNOSIS — M25.562 CHRONIC PAIN OF LEFT KNEE: ICD-10-CM

## 2023-05-17 DIAGNOSIS — R26.9 GAIT ABNORMALITY: ICD-10-CM

## 2023-05-17 DIAGNOSIS — R29.898 WEAKNESS OF LEFT LOWER EXTREMITY: Primary | ICD-10-CM

## 2023-05-17 DIAGNOSIS — G89.29 CHRONIC PAIN OF LEFT KNEE: ICD-10-CM

## 2023-05-17 DIAGNOSIS — M25.662 DECREASED RANGE OF MOTION (ROM) OF LEFT KNEE: ICD-10-CM

## 2023-05-17 PROCEDURE — 97110 THERAPEUTIC EXERCISES: CPT | Mod: PN,CQ

## 2023-05-17 NOTE — PROGRESS NOTES
[facility]   Physical Therapy Treatment Note      Name: Cesario Griffith  Clinic Number: [id number]    Therapy Diagnosis:   Encounter Diagnoses   Name Primary?    Weakness of left lower extremity Yes    Decreased range of motion (ROM) of left knee     Gait abnormality     Chronic pain of left knee        Physician: Belen Hatfield MD    Visit Date: 5/17/2023    Physician Orders: PT Eval and Treat   Medical Diagnosis from Referral: M25.562 (ICD-10-CM) - Left knee pain  Evaluation Date: 5/10/2023  Authorization Period Expiration: 12/31/2023  Plan of Care Expiration: 08/10/2023  Progress Note Due: 06/10/2023  Visit # / Visits authorized: 2/ 12   FOTO: 1/ 3      Return to MD date: prn     Precautions: Standard and hx of Left total knee arthroplasty secondary to cancer     PTA Visit #: 2/5     Time In: 1030  Time Out: 1130  Total Billable Time: 30 minutes 1:1    Subjective     Pt reports: Soreness wasn't as bad as expected.   He was compliant with home exercise program.  Response to previous treatment: as noted   Functional change: non-remarkable     Pain: 3/10  Location: left knee      Objective      Objective Measures updated at progress report unless specified.     Treatment       Cesario received the treatments listed below:      therapeutic exercises to develop strength, endurance, ROM, and flexibility for 55 minutes including:    Heel prop 5 pounds above and below joint line x 5 minutes     Measurements taken 15 cm proximal  48 cm left quad  67 cm right quad    BFR:   144 mmHg   mmHg  Following exercises: reps 30, 15, 15,15 - 30 sec breaks in between each set   2 minute break between     Quad sets   Seated LAQ in available range  Single leg shuttle 25 pounds for 30 repetitions then reduced down to 12 pounds for the remainder repetitions     Patient Education and Home Exercises       Education provided:   - Home Exercise Program   -BFR expectations    Written Home Exercises  Provided: yes. Exercises were reviewed and Cesario was able to demonstrate them prior to the end of the session.  Cesario demonstrated good  understanding of the education provided. See EMR under Patient Instructions for exercises provided during therapy sessions    Assessment     Good tolerance to treatment as he was to perform all recommended exercises without complaints. Will continue to use BFR to maximize strength gains.     Cesario Is progressing well towards his goals.   Pt prognosis is Fair.     Pt will continue to benefit from skilled outpatient physical therapy to address the deficits listed in the problem list box on initial evaluation, provide pt/family education and to maximize pt's level of independence in the home and community environment.     Pt's spiritual, cultural and educational needs considered and pt agreeable to plan of care and goals.     Anticipated barriers to physical therapy: chronicity of condition    Goals:     Short Term Goals: 4 weeks  In progress 5/17/2023  Pt will be IND with initial HEP to manage symptoms outside of PT.   Pt will report Left knee pain improved by >/= 25% with ambulation to demonstrate improved condition.   Pt will improve MMT of Left Lower Extremity by >/= 1/5 to improve tolerance for progressing rehab.   Pt will improve Left knee ROM by >= 5 degrees to improve tolerance for ambulation.      Long Term Goals: 10-12 weeks   Pt will improve FOTO score to </= 39% limited to demonstrate improved functional mobility.  Pt will be IND with final HEP to maintain/improve strength and mobility gained in PT.   Pt will report Left knee pain improved by >/= 50% with ambulation to demonstrate improved condition.   Pt will improve MMT of LLE to >/= 4-/ 5 to improve tolerance for household chores and lifting/carrying activities.   Pt will improve Left knee ROM by >/= 10 degrees to improve tolerance for ambulation.   Pt goal: Pt will report ability to return to gym and confidence in  managing his condition upon discharge from PT.     Plan     Progress left quad strength and left knee range of motion as able.     Emy Lambert, PTA

## 2023-05-23 ENCOUNTER — CLINICAL SUPPORT (OUTPATIENT)
Dept: REHABILITATION | Facility: HOSPITAL | Age: 28
End: 2023-05-23
Payer: MEDICAID

## 2023-05-23 DIAGNOSIS — M25.662 DECREASED RANGE OF MOTION (ROM) OF LEFT KNEE: ICD-10-CM

## 2023-05-23 DIAGNOSIS — R29.898 WEAKNESS OF LEFT LOWER EXTREMITY: ICD-10-CM

## 2023-05-23 DIAGNOSIS — R26.9 GAIT ABNORMALITY: ICD-10-CM

## 2023-05-23 DIAGNOSIS — M79.605 LEFT LEG PAIN: Primary | ICD-10-CM

## 2023-05-23 PROCEDURE — 97110 THERAPEUTIC EXERCISES: CPT | Mod: PN

## 2023-05-23 NOTE — PROGRESS NOTES
[facility]   Physical Therapy Treatment Note      Name: Cesario Griffith  Clinic Number: [id number]    Therapy Diagnosis:   Encounter Diagnoses   Name Primary?    Left leg pain Yes    Decreased range of motion (ROM) of left knee     Weakness of left lower extremity     Gait abnormality          Physician: Belen Hatfield MD    Visit Date: 5/23/2023    Physician Orders: PT Eval and Treat   Medical Diagnosis from Referral: M25.562 (ICD-10-CM) - Left knee pain  Evaluation Date: 5/10/2023  Authorization Period Expiration: 12/31/2023  Plan of Care Expiration: 08/10/2023  Progress Note Due: 06/10/2023  Visit # / Visits authorized: 2/ 12   FOTO: 1/ 3      Return to MD date: prn     Precautions: Standard and hx of Left total knee arthroplasty secondary to cancer     PTA Visit #: 2/5     Time In: 1003  Time Out: 1056  Total Billable Time: 53    Subjective     Pt reports: his leg feels a little stronger    He was compliant with home exercise program.  Response to previous treatment: as noted   Functional change: non-remarkable     Pain: 3/10  Location: left knee      Objective      Objective Measures updated at progress report unless specified.     Treatment       Cesario received the treatments listed below:      therapeutic exercises to develop strength, endurance, ROM, and flexibility for 53 minutes including:    Heel prop 5 pounds above and below joint line x 8 minutes   Long sitting hamstring stretch 5x15s     Measurements taken 15 cm proximal to joint line   48 cm left quad  67 cm right quad    BFR:   80% = 144 mmHg   mmHg  Following exercises: reps 30, 15, 15,15 - 30 sec breaks in between each set   2 minute break between     Seated LAQ in available range  Modified Straight leg raise leaning against mat   Single leg shuttle 12#         Patient Education and Home Exercises       Education provided:   - Home Exercise Program   -BFR expectations    Written Home Exercises Provided: yes.  Exercises were reviewed and Cesario was able to demonstrate them prior to the end of the session.  Cesario demonstrated good  understanding of the education provided. See EMR under Patient Instructions for exercises provided during therapy sessions    Assessment     Cesario presents to PT with reports of his Left Lower Extremity feeling stronger. He is improving tolerance to BFR training. Will continue to progress as able. .     Cesario Is progressing well towards his goals.   Pt prognosis is Fair.     Pt will continue to benefit from skilled outpatient physical therapy to address the deficits listed in the problem list box on initial evaluation, provide pt/family education and to maximize pt's level of independence in the home and community environment.     Pt's spiritual, cultural and educational needs considered and pt agreeable to plan of care and goals.     Anticipated barriers to physical therapy: chronicity of condition    Goals:     Short Term Goals: 4 weeks  In progress 5/23/2023  Pt will be IND with initial HEP to manage symptoms outside of PT.   Pt will report Left knee pain improved by >/= 25% with ambulation to demonstrate improved condition.   Pt will improve MMT of Left Lower Extremity by >/= 1/5 to improve tolerance for progressing rehab.   Pt will improve Left knee ROM by >= 5 degrees to improve tolerance for ambulation.      Long Term Goals: 10-12 weeks   Pt will improve FOTO score to </= 39% limited to demonstrate improved functional mobility.  Pt will be IND with final HEP to maintain/improve strength and mobility gained in PT.   Pt will report Left knee pain improved by >/= 50% with ambulation to demonstrate improved condition.   Pt will improve MMT of LLE to >/= 4-/ 5 to improve tolerance for household chores and lifting/carrying activities.   Pt will improve Left knee ROM by >/= 10 degrees to improve tolerance for ambulation.   Pt goal: Pt will report ability to return to gym and confidence in  managing his condition upon discharge from PT.     Plan     Progress left quad strength and left knee range of motion as able.     Oscar Powers, PT   Board Certified Clinical Specialist in Orthopedic Physical Therapy

## 2023-05-24 ENCOUNTER — CLINICAL SUPPORT (OUTPATIENT)
Dept: REHABILITATION | Facility: HOSPITAL | Age: 28
End: 2023-05-24
Payer: MEDICAID

## 2023-05-24 DIAGNOSIS — R29.898 WEAKNESS OF LEFT LOWER EXTREMITY: ICD-10-CM

## 2023-05-24 DIAGNOSIS — M25.662 DECREASED RANGE OF MOTION (ROM) OF LEFT KNEE: Primary | ICD-10-CM

## 2023-05-24 PROCEDURE — 97110 THERAPEUTIC EXERCISES: CPT | Mod: PN

## 2023-05-24 NOTE — PROGRESS NOTES
[facility]   Physical Therapy Treatment Note      Name: Cesario Griffith  Clinic Number: [id number]    Therapy Diagnosis:   Encounter Diagnoses   Name Primary?    Decreased range of motion (ROM) of left knee Yes    Weakness of left lower extremity        Physician: Belen Hatfield MD    Visit Date: 5/24/2023    Physician Orders: PT Eval and Treat   Medical Diagnosis from Referral: M25.562 (ICD-10-CM) - Left knee pain  Evaluation Date: 5/10/2023  Authorization Period Expiration: 12/31/2023  Plan of Care Expiration: 08/10/2023  Progress Note Due: 06/10/2023  Visit # / Visits authorized: 4/ 12   FOTO: 1/ 3      Return to MD date: prn     Precautions: Standard and hx of Left total knee arthroplasty secondary to cancer     PTA Visit #: 0/5     Time In: 1500  Time Out: 1553  Total Billable Time: 53    Subjective     Pt reports: was sore after last treatment   He was compliant with home exercise program.  Response to previous treatment: as noted   Functional change: non-remarkable     Pain: 3/10  Location: left knee      Objective      Objective Measures updated at progress report unless specified.     Treatment   PT technician assisted with treatment under direct supervision of PT       Cesario received the treatments listed below:      therapeutic exercises to develop strength, endurance, ROM, and flexibility for 53 minutes including:    Heel prop 5 pounds above and below joint line x 8 minutes   Long sitting hamstring stretch 5x15s   Quad sets x20   Long arc quad  3x12  Modified Straight leg raise leaning against mat 3x10  Shuttle with 25# 3x12   Prone hamstring curls 3x12   Clamshells with Red Theraband 3x12 each side       Not today:   Measurements taken 15 cm proximal to joint line   48 cm left quad  67 cm right quad    BFR:   80% = 144 mmHg   mmHg  Following exercises: reps 30, 15, 15,15 - 30 sec breaks in between each set   2 minute break between     Seated LAQ in available  range  Modified Straight leg raise leaning against mat   Single leg shuttle 12#         Patient Education and Home Exercises       Education provided:   - Home Exercise Program   -BFR expectations    Written Home Exercises Provided: yes. Exercises were reviewed and Cesario was able to demonstrate them prior to the end of the session.  Cesario demonstrated good  understanding of the education provided. See EMR under Patient Instructions for exercises provided during therapy sessions    Assessment     Cesario tolerated treatment well. No BFR today secondary to back to back treatment days. Focus remained on lower extremity strength/endurance. Will progress as able.     Cesario Is progressing well towards his goals.   Pt prognosis is Fair.     Pt will continue to benefit from skilled outpatient physical therapy to address the deficits listed in the problem list box on initial evaluation, provide pt/family education and to maximize pt's level of independence in the home and community environment.     Pt's spiritual, cultural and educational needs considered and pt agreeable to plan of care and goals.     Anticipated barriers to physical therapy: chronicity of condition    Goals:     Short Term Goals: 4 weeks  In progress 5/24/2023  Pt will be IND with initial HEP to manage symptoms outside of PT.   Pt will report Left knee pain improved by >/= 25% with ambulation to demonstrate improved condition.   Pt will improve MMT of Left Lower Extremity by >/= 1/5 to improve tolerance for progressing rehab.   Pt will improve Left knee ROM by >= 5 degrees to improve tolerance for ambulation.      Long Term Goals: 10-12 weeks   Pt will improve FOTO score to </= 39% limited to demonstrate improved functional mobility.  Pt will be IND with final HEP to maintain/improve strength and mobility gained in PT.   Pt will report Left knee pain improved by >/= 50% with ambulation to demonstrate improved condition.   Pt will improve MMT of LLE to  >/= 4-/ 5 to improve tolerance for household chores and lifting/carrying activities.   Pt will improve Left knee ROM by >/= 10 degrees to improve tolerance for ambulation.   Pt goal: Pt will report ability to return to gym and confidence in managing his condition upon discharge from PT.     Plan     Progress left quad strength and left knee range of motion as able.     Oscar Powers, PT   Board Certified Clinical Specialist in Orthopedic Physical Therapy

## 2023-06-02 ENCOUNTER — CLINICAL SUPPORT (OUTPATIENT)
Dept: REHABILITATION | Facility: HOSPITAL | Age: 28
End: 2023-06-02
Payer: MEDICAID

## 2023-06-02 DIAGNOSIS — M25.662 DECREASED RANGE OF MOTION (ROM) OF LEFT KNEE: Primary | ICD-10-CM

## 2023-06-02 DIAGNOSIS — M79.605 LEFT LEG PAIN: ICD-10-CM

## 2023-06-02 DIAGNOSIS — R29.898 WEAKNESS OF LEFT LOWER EXTREMITY: ICD-10-CM

## 2023-06-02 PROCEDURE — 97110 THERAPEUTIC EXERCISES: CPT | Mod: PN,CQ

## 2023-06-02 NOTE — PROGRESS NOTES
[facility]   Physical Therapy Treatment Note      Name: Cesario Griffith  Clinic Number: [id number]    Therapy Diagnosis:   Encounter Diagnoses   Name Primary?    Decreased range of motion (ROM) of left knee Yes    Weakness of left lower extremity     Left leg pain          Physician: Belen Hatfield MD    Visit Date: 6/2/2023    Physician Orders: PT Eval and Treat   Medical Diagnosis from Referral: M25.562 (ICD-10-CM) - Left knee pain  Evaluation Date: 5/10/2023  Authorization Period Expiration: 12/31/2023  Plan of Care Expiration: 08/10/2023  Progress Note Due: 06/10/2023  Visit # / Visits authorized: 5/ 12   FOTO: 1/ 3      Return to MD date: prn     Precautions: Standard and hx of Left total knee arthroplasty secondary to cancer     PTA Visit #: 1/5     Time In: 1100  Time Out: 1200  Total Billable Time: 53    Subjective     Pt reports: No complaints from previous treatment session.   He was compliant with home exercise program.  Response to previous treatment: as noted   Functional change: non-remarkable     Pain: 3/10  Location: left knee      Objective      Objective Measures updated at progress report unless specified.     Treatment   PT technician assisted with treatment under direct supervision of PT       Cesario received the treatments listed below:      therapeutic exercises to develop strength, endurance, ROM, and flexibility for 53 minutes including:    Heel prop 5 pounds above and below joint line x 8 minutes   Long sitting hamstring stretch 5x15s     BFR:   80% = 144 mmHg   mmHg  Following exercises: reps 30, 15, 15,15 - 30 sec breaks in between each set   2 minute break between     Quad sets   Seated LAQ in available range  Modified Straight leg raise leaning against mat   Single leg shuttle 12#     Not today:   Measurements taken 15 cm proximal to joint line   48 cm left quad  67 cm right quad    Quad sets x20   Long arc quad  3x12  Modified Straight leg raise  leaning against mat 3x10  Shuttle with 25# 3x12   Prone hamstring curls 3x12   Clamshells with Red Theraband 3x12 each side       Patient Education and Home Exercises       Education provided:   - Home Exercise Program   -BFR expectations    Written Home Exercises Provided: yes. Exercises were reviewed and Cesario was able to demonstrate them prior to the end of the session.  Cesario demonstrated good  understanding of the education provided. See EMR under Patient Instructions for exercises provided during therapy sessions    Assessment     Cesario tolerated BFR well. Provided education on the importance of Home Exercise Program compliance to improve gait. Continued focus on right lower extremity strength and range of motion. Will continue to progress as able.      Cesario Is progressing well towards his goals.   Pt prognosis is Fair.     Pt will continue to benefit from skilled outpatient physical therapy to address the deficits listed in the problem list box on initial evaluation, provide pt/family education and to maximize pt's level of independence in the home and community environment.     Pt's spiritual, cultural and educational needs considered and pt agreeable to plan of care and goals.     Anticipated barriers to physical therapy: chronicity of condition    Goals:     Short Term Goals: 4 weeks  In progress 6/2/2023  Pt will be IND with initial HEP to manage symptoms outside of PT.   Pt will report Left knee pain improved by >/= 25% with ambulation to demonstrate improved condition.   Pt will improve MMT of Left Lower Extremity by >/= 1/5 to improve tolerance for progressing rehab.   Pt will improve Left knee ROM by >= 5 degrees to improve tolerance for ambulation.      Long Term Goals: 10-12 weeks   Pt will improve FOTO score to </= 39% limited to demonstrate improved functional mobility.  Pt will be IND with final HEP to maintain/improve strength and mobility gained in PT.   Pt will report Left knee pain  improved by >/= 50% with ambulation to demonstrate improved condition.   Pt will improve MMT of LLE to >/= 4-/ 5 to improve tolerance for household chores and lifting/carrying activities.   Pt will improve Left knee ROM by >/= 10 degrees to improve tolerance for ambulation.   Pt goal: Pt will report ability to return to gym and confidence in managing his condition upon discharge from PT.     Plan     Progress left quad strength and left knee range of motion as able.     Emy Lambert PTA   Board Certified Clinical Specialist in Orthopedic Physical Therapy

## 2023-06-06 ENCOUNTER — CLINICAL SUPPORT (OUTPATIENT)
Dept: REHABILITATION | Facility: HOSPITAL | Age: 28
End: 2023-06-06
Payer: MEDICAID

## 2023-06-06 DIAGNOSIS — M79.605 LEFT LEG PAIN: ICD-10-CM

## 2023-06-06 DIAGNOSIS — M25.662 DECREASED RANGE OF MOTION (ROM) OF LEFT KNEE: ICD-10-CM

## 2023-06-06 DIAGNOSIS — R29.898 WEAKNESS OF LEFT LOWER EXTREMITY: Primary | ICD-10-CM

## 2023-06-06 PROCEDURE — 97110 THERAPEUTIC EXERCISES: CPT | Mod: PN

## 2023-06-06 NOTE — PROGRESS NOTES
[facility]   Physical Therapy Treatment Note/Re-Assessment     Name: Cesario Griffith  Clinic Number: [id number]    Therapy Diagnosis:   Encounter Diagnoses   Name Primary?    Weakness of left lower extremity Yes    Left leg pain     Decreased range of motion (ROM) of left knee            Physician: Belen Hatfield MD    Visit Date: 6/6/2023    Physician Orders: PT Eval and Treat   Medical Diagnosis from Referral: M25.562 (ICD-10-CM) - Left knee pain  Evaluation Date: 5/10/2023  Authorization Period Expiration: 12/31/2023  Plan of Care Expiration: 08/10/2023  Progress Note Due: 06/10/2023  Visit # / Visits authorized: 6/ 12   FOTO: 1/ 3      Return to MD date: prn     Precautions: Standard and hx of Left total knee arthroplasty secondary to cancer     PTA Visit #: 0/5     Time In: 1001  Time Out: 1054  Total Billable Time: 53    Subjective     Pt reports: knee doing better, feels like he is getting stronger  He was compliant with home exercise program.  Response to previous treatment: as noted   Functional change: non-remarkable     Pain: 3/10  Location: left knee      Objective        Range of Motion (Passive):   Knee   Comments   Left 0/10/70     RIght 0/0/130        Range of Motion (Active):   Knee   Comments   Left 0/10/70     Right 0/0/130       Lower Extremity Strength  Right LE   Left LE     Quadriceps: 5/5 Quadriceps: 2/5   Hamstrings: 5/5 Hamstrings: 2/5   Hip flexion (supine): 5/5 Hip flexion (supine): 2/5   Hip extension:  4/5 Hip extension: 2/5   Hip abduction:  4/5 Hip abduction 2/5   Hip ER:  5/5 Hip ER:  3/5   Hip IR: 5/5 Hip IR: 3/5        Treatment   PT technician assisted with treatment under direct supervision of PT       Cesario received the treatments listed below:      therapeutic exercises to develop strength, endurance, ROM, and flexibility for 53 minutes including:    Heel prop 5 pounds above and below joint line x 8 minutes   Long sitting hamstring stretch  5x15s     BFR:   80% = 144 mmHg   mmHg  Following exercises: reps 30, 15, 15,15 - 30 sec breaks in between each set   2 minute break between     Seated LAQ in available range  Modified Straight leg raise leaning against mat   Single leg shuttle 25#     Not today:   Measurements taken 15 cm proximal to joint line   48 cm left quad  67 cm right quad    Quad sets x20   Long arc quad  3x12  Modified Straight leg raise leaning against mat 3x10  Shuttle with 37# 3x12   Prone hamstring curls 3x12   Clamshells with Red Theraband 3x12 each side       Patient Education and Home Exercises       Education provided:   - Home Exercise Program   -BFR expectations    Written Home Exercises Provided: yes. Exercises were reviewed and Cesario was able to demonstrate them prior to the end of the session.  Cesario demonstrated good  understanding of the education provided. See EMR under Patient Instructions for exercises provided during therapy sessions    Assessment     Cesario has been seen for 6 visits over the past month and demonstrates min improvement in knee Range of Motion. He continues with significant Left Lower Extremity weakness. Due to the chronicity of his condition his prognosis is lengthened. He will continue to benefit from skilled outpatient PT to address the above deficits.     Cesario Is progressing well towards his goals.   Pt prognosis is Fair.     Pt will continue to benefit from skilled outpatient physical therapy to address the deficits listed in the problem list box on initial evaluation, provide pt/family education and to maximize pt's level of independence in the home and community environment.     Pt's spiritual, cultural and educational needs considered and pt agreeable to plan of care and goals.     Anticipated barriers to physical therapy: chronicity of condition    Goals:     Short Term Goals: 4 weeks  In progress 6/6/2023  Pt will be IND with initial HEP to manage symptoms outside of PT.   Pt will  report Left knee pain improved by >/= 25% with ambulation to demonstrate improved condition.   Pt will improve MMT of Left Lower Extremity by >/= 1/5 to improve tolerance for progressing rehab.   Pt will improve Left knee ROM by >= 5 degrees to improve tolerance for ambulation.      Long Term Goals: 10-12 weeks   Pt will improve FOTO score to </= 39% limited to demonstrate improved functional mobility.  Pt will be IND with final HEP to maintain/improve strength and mobility gained in PT.   Pt will report Left knee pain improved by >/= 50% with ambulation to demonstrate improved condition.   Pt will improve MMT of LLE to >/= 4-/ 5 to improve tolerance for household chores and lifting/carrying activities.   Pt will improve Left knee ROM by >/= 10 degrees to improve tolerance for ambulation.   Pt goal: Pt will report ability to return to gym and confidence in managing his condition upon discharge from PT.     Plan     Progress left quad strength and left knee range of motion as able.     Oscar Powers, PT   Board Certified Clinical Specialist in Orthopedic Physical Therapy

## 2023-06-07 ENCOUNTER — CLINICAL SUPPORT (OUTPATIENT)
Dept: REHABILITATION | Facility: HOSPITAL | Age: 28
End: 2023-06-07
Payer: MEDICAID

## 2023-06-07 DIAGNOSIS — M25.662 DECREASED RANGE OF MOTION (ROM) OF LEFT KNEE: ICD-10-CM

## 2023-06-07 DIAGNOSIS — M62.81 MUSCLE WEAKNESS (GENERALIZED): Primary | ICD-10-CM

## 2023-06-07 DIAGNOSIS — R29.898 WEAKNESS OF LEFT LOWER EXTREMITY: ICD-10-CM

## 2023-06-07 PROCEDURE — 97110 THERAPEUTIC EXERCISES: CPT | Mod: PN

## 2023-06-07 NOTE — PROGRESS NOTES
[facility]   Physical Therapy Treatment Note/Re-Assessment     Name: Cesario Griffith  Clinic Number: [id number]    Therapy Diagnosis:   Encounter Diagnoses   Name Primary?    Muscle weakness (generalized) Yes    Weakness of left lower extremity     Decreased range of motion (ROM) of left knee              Physician: Belen Hatfield MD    Visit Date: 6/7/2023    Physician Orders: PT Eval and Treat   Medical Diagnosis from Referral: M25.562 (ICD-10-CM) - Left knee pain  Evaluation Date: 5/10/2023  Authorization Period Expiration: 12/31/2023  Plan of Care Expiration: 08/10/2023  Progress Note Due: 06/10/2023  Visit # / Visits authorized: 6/ 12   FOTO: 1/ 3      Return to MD date: prn     Precautions: Standard and hx of Left total knee arthroplasty secondary to cancer     PTA Visit #: 0/5     Time In: 1500  Time Out: 1555  Total Billable Time: 55    Subjective     Pt reports: did well after yesterday's treatment     He was compliant with home exercise program.  Response to previous treatment: as noted   Functional change: non-remarkable     Pain: 3/10  Location: left knee      Objective        None today    Treatment         Cesario received the treatments listed below:      therapeutic exercises to develop strength, endurance, ROM, and flexibility for 55 minutes including:    Heel prop 5 pounds above and below joint line x 8 minutes   Long sitting hamstring stretch 5x15s   Lateral weight shifts to Left Lower Extremity x20 with 5s hold  Bridges 3x12  Clamshells with Red Theraband 3xmuscle burn each side   Shuttle leg press 3x12 with 75#   Shuttle single leg press 3x12 with 37# each side  Prone hamstring curls 3x12   Long arc quad  3x12    Not today:   BFR:   80% = 144 mmHg   mmHg  Following exercises: reps 30, 15, 15,15 - 30 sec breaks in between each set   2 minute break between     Seated LAQ in available range  Modified Straight leg raise leaning against mat   Single leg shuttle  25# \    Measurements taken 15 cm proximal to joint line   48 cm left quad  67 cm right quad        Patient Education and Home Exercises       Education provided:   - Home Exercise Program   -BFR expectations    Written Home Exercises Provided: yes. Exercises were reviewed and Cesario was able to demonstrate them prior to the end of the session.  Cesario demonstrated good  understanding of the education provided. See EMR under Patient Instructions for exercises provided during therapy sessions    Assessment     Cesario tolerated treatment well. Focused on improving overall lower extremity strength and weight bearing tolerance to Left Lower Extremity. Unable to tolerate full weightbearing on Left Lower Extremity. Will progress as able.     Cesario Is progressing well towards his goals.   Pt prognosis is Fair.     Pt will continue to benefit from skilled outpatient physical therapy to address the deficits listed in the problem list box on initial evaluation, provide pt/family education and to maximize pt's level of independence in the home and community environment.     Pt's spiritual, cultural and educational needs considered and pt agreeable to plan of care and goals.     Anticipated barriers to physical therapy: chronicity of condition    Goals:     Short Term Goals: 4 weeks  In progress 6/7/2023  Pt will be IND with initial HEP to manage symptoms outside of PT.   Pt will report Left knee pain improved by >/= 25% with ambulation to demonstrate improved condition.   Pt will improve MMT of Left Lower Extremity by >/= 1/5 to improve tolerance for progressing rehab.   Pt will improve Left knee ROM by >= 5 degrees to improve tolerance for ambulation.      Long Term Goals: 10-12 weeks   Pt will improve FOTO score to </= 39% limited to demonstrate improved functional mobility.  Pt will be IND with final HEP to maintain/improve strength and mobility gained in PT.   Pt will report Left knee pain improved by >/= 50% with  ambulation to demonstrate improved condition.   Pt will improve MMT of LLE to >/= 4-/ 5 to improve tolerance for household chores and lifting/carrying activities.   Pt will improve Left knee ROM by >/= 10 degrees to improve tolerance for ambulation.   Pt goal: Pt will report ability to return to gym and confidence in managing his condition upon discharge from PT.     Plan     Progress left quad strength and left knee range of motion as able.     Oscar Powers, PT   Board Certified Clinical Specialist in Orthopedic Physical Therapy

## 2023-06-20 ENCOUNTER — CLINICAL SUPPORT (OUTPATIENT)
Dept: REHABILITATION | Facility: HOSPITAL | Age: 28
End: 2023-06-20
Payer: MEDICAID

## 2023-06-20 DIAGNOSIS — R29.898 WEAKNESS OF LEFT LOWER EXTREMITY: ICD-10-CM

## 2023-06-20 DIAGNOSIS — M25.662 DECREASED RANGE OF MOTION (ROM) OF LEFT KNEE: Primary | ICD-10-CM

## 2023-06-20 PROCEDURE — 97110 THERAPEUTIC EXERCISES: CPT | Mod: PN

## 2023-06-20 NOTE — PROGRESS NOTES
[facility]   Physical Therapy Treatment Note/Re-Assessment     Name: Cesario Griffith  Clinic Number: [id number]    Therapy Diagnosis:   Encounter Diagnoses   Name Primary?    Decreased range of motion (ROM) of left knee Yes    Weakness of left lower extremity                Physician: Belen Hatfield MD    Visit Date: 6/20/2023    Physician Orders: PT Eval and Treat   Medical Diagnosis from Referral: M25.562 (ICD-10-CM) - Left knee pain  Evaluation Date: 5/10/2023  Authorization Period Expiration: 12/31/2023  Plan of Care Expiration: 08/10/2023  Progress Note Due: 06/10/2023  Visit # / Visits authorized: 8/12   FOTO: 1/ 3      Return to MD date: prn     Precautions: Standard and hx of Left total knee arthroplasty secondary to cancer     PTA Visit #: 0/5     Time In: 1500  Time Out: 1555  Total Billable Time: 55    Subjective     Pt reports: knee is sore with the weather    He was compliant with home exercise program.  Response to previous treatment: as noted   Functional change: non-remarkable     Pain: 3/10  Location: left knee      Objective        None today    Treatment     Cesario received the treatments listed below:      therapeutic exercises to develop strength, endurance, ROM, and flexibility for 55 minutes including:    Heel prop 5 pounds above and below joint line x 8 minutes   Long sitting hamstring stretch 5x15s   BFR:   80% = 144 mmHg   mmHg  Following exercises: reps 30, 15, 15,15 - 30 sec breaks in between each set   2 minute break between     Seated LAQ in available range  Modified Straight leg raise leaning against mat   Single leg shuttle 25#     Bridges 3x12  Clamshells with Red Theraband 3xmuscle burn each side   Shuttle leg press 3x12 with 75#   Shuttle single leg press 3x12 with 37# each side  Prone hamstring curls 3x12       Measurements taken 15 cm proximal to joint line   48 cm left quad  67 cm right quad        Patient Education and Home Exercises        Education provided:   - Home Exercise Program   -BFR expectations    Written Home Exercises Provided: yes. Exercises were reviewed and Cesario was able to demonstrate them prior to the end of the session.  Cesario demonstrated good  understanding of the education provided. See EMR under Patient Instructions for exercises provided during therapy sessions    Assessment     Cesario has been seen for 8 visits over the past 4 weeks and demonstrates no significant change in range of motion or quad girth since evaluation. Due to the chronicity of his condition his prognosis is lengthened. He will continue to benefit from skilled outpatient PT to address deficits in lower extremity strength, Left knee Range of Motion, and gait training. Plan to test quad strength with crane scale next visit.     Cesario Is progressing well towards his goals.   Pt prognosis is Fair.     Pt will continue to benefit from skilled outpatient physical therapy to address the deficits listed in the problem list box on initial evaluation, provide pt/family education and to maximize pt's level of independence in the home and community environment.     Pt's spiritual, cultural and educational needs considered and pt agreeable to plan of care and goals.     Anticipated barriers to physical therapy: chronicity of condition    Goals:     Short Term Goals: 4 weeks  In progress 6/20/2023  Pt will be IND with initial HEP to manage symptoms outside of PT.   Pt will report Left knee pain improved by >/= 25% with ambulation to demonstrate improved condition.   Pt will improve MMT of Left Lower Extremity by >/= 1/5 to improve tolerance for progressing rehab.   Pt will improve Left knee ROM by >= 5 degrees to improve tolerance for ambulation.      Long Term Goals: 10-12 weeks   Pt will improve FOTO score to </= 39% limited to demonstrate improved functional mobility.  Pt will be IND with final HEP to maintain/improve strength and mobility gained in PT.   Pt will  report Left knee pain improved by >/= 50% with ambulation to demonstrate improved condition.   Pt will improve MMT of LLE to >/= 4-/ 5 to improve tolerance for household chores and lifting/carrying activities.   Pt will improve Left knee ROM by >/= 10 degrees to improve tolerance for ambulation.   Pt goal: Pt will report ability to return to gym and confidence in managing his condition upon discharge from PT.     Plan     Progress left quad strength and left knee range of motion as able.     Oscar Powers, PT   Board Certified Clinical Specialist in Orthopedic Physical Therapy

## 2023-06-26 ENCOUNTER — CLINICAL SUPPORT (OUTPATIENT)
Dept: REHABILITATION | Facility: HOSPITAL | Age: 28
End: 2023-06-26
Payer: MEDICAID

## 2023-06-26 DIAGNOSIS — M25.662 DECREASED RANGE OF MOTION (ROM) OF LEFT KNEE: ICD-10-CM

## 2023-06-26 DIAGNOSIS — R29.898 WEAKNESS OF LEFT LOWER EXTREMITY: Primary | ICD-10-CM

## 2023-06-26 PROCEDURE — 97110 THERAPEUTIC EXERCISES: CPT | Mod: PN

## 2023-06-26 NOTE — PROGRESS NOTES
[facility]   Physical Therapy Treatment Note/Re-Assessment     Name: Cesario Griffith  Clinic Number: [id number]    Therapy Diagnosis:   Encounter Diagnoses   Name Primary?    Weakness of left lower extremity Yes    Decreased range of motion (ROM) of left knee        Physician: Belen Hatfield MD    Visit Date: 6/26/2023    Physician Orders: PT Eval and Treat   Medical Diagnosis from Referral: M25.562 (ICD-10-CM) - Left knee pain  Evaluation Date: 5/10/2023  Authorization Period Expiration: 12/31/2023  Plan of Care Expiration: 08/10/2023  Progress Note Due: 06/10/2023  Visit # / Visits authorized: 9/12   FOTO: 1/ 3      Return to MD date: prn     Precautions: Standard and hx of Left total knee arthroplasty secondary to cancer     PTA Visit #: 0/5     Time In: 1308  Time Out: 1401  Total Billable Time: 53    Subjective     Pt reports: knee isn't hurting like usual, still difficult to walk on it    He was compliant with home exercise program.  Response to previous treatment: as noted   Functional change: non-remarkable     Pain: 0/10  Location: left knee      Objective        None today    Treatment     Cesario received the treatments listed below:      therapeutic exercises to develop strength, endurance, ROM, and flexibility for 53 minutes including:    Nustep x8mins   Heel prop 5 pounds above and below joint line x 5minutes   Long sitting hamstring stretch 5x15s   Terminal knee extension with Blue theraband 3x10   Weight shifts to Left Lower Extremity in standing 2x10 with 5s hold   Modified Straight leg raise  Leaning against mat 3x10   Long arc quad  with 3# ankle weight 3x10  Prone hamstring curls 3x10  Shuttle leg press with 75# 3x10   Shuttle single leg press with 32# 3x10       Not today:   BFR:   80% = 144 mmHg   mmHg  Following exercises: reps 30, 15, 15,15 - 30 sec breaks in between each set   2 minute break between     Seated LAQ in available range  Modified Straight  leg raise leaning against mat   Single leg shuttle 25#     Bridges 3x12  Clamshells with Red Theraband 3xmuscle burn each side   Shuttle leg press 3x12 with 75#   Shuttle single leg press 3x12 with 37# each side         Measurements taken 15 cm proximal to joint line   48 cm left quad  67 cm right quad        Patient Education and Home Exercises       Education provided:   - Home Exercise Program   -BFR expectations    Written Home Exercises Provided: yes. Exercises were reviewed and Cesario was able to demonstrate them prior to the end of the session.  Cesario demonstrated good  understanding of the education provided. See EMR under Patient Instructions for exercises provided during therapy sessions    Assessment     Cesario presents to PT with reports of improved symptoms with ambulation and improved ability to lift his leg up onto therapy mat. He notes improved strength. Increased focus on weight bearing today, pt reports Left knee pain towards end of weight shifts. Will progress as able.     Cesario Is progressing well towards his goals.   Pt prognosis is Fair.     Pt will continue to benefit from skilled outpatient physical therapy to address the deficits listed in the problem list box on initial evaluation, provide pt/family education and to maximize pt's level of independence in the home and community environment.     Pt's spiritual, cultural and educational needs considered and pt agreeable to plan of care and goals.     Anticipated barriers to physical therapy: chronicity of condition    Goals:     Short Term Goals: 4 weeks  In progress 6/26/2023  Pt will be IND with initial HEP to manage symptoms outside of PT.   Pt will report Left knee pain improved by >/= 25% with ambulation to demonstrate improved condition.   Pt will improve MMT of Left Lower Extremity by >/= 1/5 to improve tolerance for progressing rehab.   Pt will improve Left knee ROM by >= 5 degrees to improve tolerance for ambulation.      Long Term  Goals: 10-12 weeks   Pt will improve FOTO score to </= 39% limited to demonstrate improved functional mobility.  Pt will be IND with final HEP to maintain/improve strength and mobility gained in PT.   Pt will report Left knee pain improved by >/= 50% with ambulation to demonstrate improved condition.   Pt will improve MMT of LLE to >/= 4-/ 5 to improve tolerance for household chores and lifting/carrying activities.   Pt will improve Left knee ROM by >/= 10 degrees to improve tolerance for ambulation.   Pt goal: Pt will report ability to return to gym and confidence in managing his condition upon discharge from PT.     Plan     Progress left quad strength and left knee range of motion as able.     Oscar Powers, PT   Board Certified Clinical Specialist in Orthopedic Physical Therapy

## 2023-07-11 ENCOUNTER — CLINICAL SUPPORT (OUTPATIENT)
Dept: REHABILITATION | Facility: HOSPITAL | Age: 28
End: 2023-07-11
Payer: MEDICAID

## 2023-07-11 DIAGNOSIS — R29.898 WEAKNESS OF LEFT LOWER EXTREMITY: Primary | ICD-10-CM

## 2023-07-11 DIAGNOSIS — M25.662 DECREASED RANGE OF MOTION (ROM) OF LEFT KNEE: ICD-10-CM

## 2023-07-11 PROCEDURE — 97110 THERAPEUTIC EXERCISES: CPT | Mod: PN

## 2023-07-11 NOTE — PROGRESS NOTES
[facility]   Physical Therapy Treatment Note/Re-Assessment     Name: Cesario Griffith  Clinic Number: [id number]    Therapy Diagnosis:   Encounter Diagnoses   Name Primary?    Weakness of left lower extremity Yes    Decreased range of motion (ROM) of left knee          Physician: Belen Hatfield MD    Visit Date: 7/11/2023    Physician Orders: PT Eval and Treat   Medical Diagnosis from Referral: M25.562 (ICD-10-CM) - Left knee pain  Evaluation Date: 5/10/2023  Authorization Period Expiration: 12/31/2023  Plan of Care Expiration: 08/10/2023  Progress Note Due: 06/10/2023  Visit # / Visits authorized: 9/12   FOTO: 1/ 3      Return to MD date: prn     Precautions: Standard and hx of Left total knee arthroplasty secondary to cancer     PTA Visit #: 0/5     Time In: 1500  Time Out: 1555  Total Billable Time: 53    Subjective     Pt reports: he missed last week because of scheduling conflicts     He was compliant with home exercise program.  Response to previous treatment: as noted   Functional change: non-remarkable     Pain: 0/10  Location: left knee      Objective        None today    Treatment   PT extender assisted with treatment under direct supervision of PT     Cesario received the treatments listed below:      therapeutic exercises to develop strength, endurance, ROM, and flexibility for 55 minutes including:    Nustep x8mins   Heel prop 5 pounds above and below joint line x 5minutes   Long sitting hamstring stretch 5x15s  Long arc quad  with 3# ankle weight 4x8   Terminal knee extension with Blue theraband 3x10   Weight shifts to Left Lower Extremity in standing 2x10 with 5s hold   Modified Straight leg raise  Leaning against mat 3x10   Prone hamstring curls 3x10  Shuttle leg press with 75# 3x10   Shuttle single leg press with 32# 3x10   Clamshells with Green Theraband 3x10 each side       Not today:   BFR:   80% = 144 mmHg   mmHg  Following exercises: reps 30, 15, 15,15 - 30  sec breaks in between each set   2 minute break between     Seated LAQ in available range  Modified Straight leg raise leaning against mat   Single leg shuttle 25#     Bridges 3x12  Clamshells with Red Theraband 3xmuscle burn each side   Shuttle leg press 3x12 with 75#   Shuttle single leg press 3x12 with 37# each side         Measurements taken 15 cm proximal to joint line   48 cm left quad  67 cm right quad        Patient Education and Home Exercises       Education provided:   - Home Exercise Program   -BFR expectations    Written Home Exercises Provided: yes. Exercises were reviewed and Cesario was able to demonstrate them prior to the end of the session.  Cesario demonstrated good  understanding of the education provided. See EMR under Patient Instructions for exercises provided during therapy sessions    Assessment     Cesario demonstrates improved weight bearing tolerance today. Plan to continue increasing load to Left knee as able to maximize lower extremity function. Will re-assess next visit to update plan of care.     Cesario Is progressing well towards his goals.   Pt prognosis is Fair.     Pt will continue to benefit from skilled outpatient physical therapy to address the deficits listed in the problem list box on initial evaluation, provide pt/family education and to maximize pt's level of independence in the home and community environment.     Pt's spiritual, cultural and educational needs considered and pt agreeable to plan of care and goals.     Anticipated barriers to physical therapy: chronicity of condition    Goals:     Short Term Goals: 4 weeks  In progress 7/11/2023  Pt will be IND with initial HEP to manage symptoms outside of PT.   Pt will report Left knee pain improved by >/= 25% with ambulation to demonstrate improved condition.   Pt will improve MMT of Left Lower Extremity by >/= 1/5 to improve tolerance for progressing rehab.   Pt will improve Left knee ROM by >= 5 degrees to improve  tolerance for ambulation.      Long Term Goals: 10-12 weeks   Pt will improve FOTO score to </= 39% limited to demonstrate improved functional mobility.  Pt will be IND with final HEP to maintain/improve strength and mobility gained in PT.   Pt will report Left knee pain improved by >/= 50% with ambulation to demonstrate improved condition.   Pt will improve MMT of LLE to >/= 4-/ 5 to improve tolerance for household chores and lifting/carrying activities.   Pt will improve Left knee ROM by >/= 10 degrees to improve tolerance for ambulation.   Pt goal: Pt will report ability to return to gym and confidence in managing his condition upon discharge from PT.     Plan     Progress left quad strength and left knee range of motion as able.     Oscar Powers, PT   Board Certified Clinical Specialist in Orthopedic Physical Therapy

## 2023-11-17 DIAGNOSIS — M25.562 LEFT KNEE PAIN: Primary | ICD-10-CM

## 2024-07-08 DIAGNOSIS — M24.562 FLEXION CONTRACTURE OF KNEE, LEFT: Primary | ICD-10-CM

## 2024-07-08 DIAGNOSIS — M25.562 ARTHRALGIA OF KNEE, LEFT: ICD-10-CM

## 2024-07-09 ENCOUNTER — CLINICAL SUPPORT (OUTPATIENT)
Dept: REHABILITATION | Facility: HOSPITAL | Age: 29
End: 2024-07-09
Payer: MEDICAID

## 2024-07-09 DIAGNOSIS — M25.662 DECREASED RANGE OF MOTION (ROM) OF LEFT KNEE: ICD-10-CM

## 2024-07-09 DIAGNOSIS — R26.9 GAIT ABNORMALITY: ICD-10-CM

## 2024-07-09 DIAGNOSIS — R29.898 DECREASED STRENGTH OF LOWER EXTREMITY: ICD-10-CM

## 2024-07-09 DIAGNOSIS — M24.562 FLEXION CONTRACTURE OF KNEE, LEFT: ICD-10-CM

## 2024-07-09 DIAGNOSIS — M25.562 ARTHRALGIA OF KNEE, LEFT: Primary | ICD-10-CM

## 2024-07-09 PROCEDURE — 97161 PT EVAL LOW COMPLEX 20 MIN: CPT | Mod: PN | Performed by: PHYSICAL THERAPIST

## 2024-07-09 PROCEDURE — 97110 THERAPEUTIC EXERCISES: CPT | Mod: PN | Performed by: PHYSICAL THERAPIST

## 2024-07-10 NOTE — PLAN OF CARE
"[facility] OUTPATIENT THERAPY AND WELLNESS   Physical Therapy Initial Evaluation     Name: Cesario Griffith  Clinic Number: [id number]    Therapy Diagnosis:   Encounter Diagnoses   Name Primary?    Arthralgia of knee, left Yes    Flexion contracture of knee, left     Decreased range of motion (ROM) of left knee     Decreased strength of lower extremity     Gait abnormality         Physician: Kael Oneill MD    Physician Orders: PT Eval and Treat   Medical Diagnosis from Referral: Arthralgia of knee, left & Flexion contracture of knee, left   Evaluation Date: 7/9/2024  Authorization Period Expiration: 7/8/2025  Plan of Care Expiration: 9/13/2024  Progress Note Due: 8/9/2024  Visit # / Visits authorized: 1/ 1  (POC: 1/16)   FOTO: 1/3    Precautions: Standard and cancer   Insurance: Payor: MEDICAID / Plan: [place] [employer] CONNECT / Product Type: Managed Medicaid /     Time In: 1400  Time Out: 1500  Total Appointment Time (timed & untimed codes): 60 minutes      SUBJECTIVE   Date of onset: Chronic    History of current condition - Cesario reports: he underwent a left TKA with jose daniel placement in August of 2012 secondary to bone cancer. He states he did not do much PT following surgery and is no walking with a limp. He reports pain with prolonged standing and walking. He notes difficulty ascending stairs. He reports limitation in ROM and weakness of the left lower extremity. He notes difficulty with prolonged standing and walking. He also reports difficulty with ambulation on wet surfaces. He complains of medial and lateral joint line pain. He has attended PT prior, in 2022 & 2023, but his participation was limited due to transportation issues.    Falls: 1 per month    Imaging, X-rays: "Postsurgical changes of a total left knee arthroplasty with longstem femoral component. Hardware is intact without perihardware lucency. No fracture or malalignment. Joint spaces are preserved. Scattered surgical clips. Unremarkable " "surrounding soft tissues "    Prior Therapy: PT  Social History: Single lives alone  Occupation: Not working  Prior Level of Function: Independent  Current Level of Function: Decreased ability to perform ADL and limited tolerance to standing and walking.    Pain:  Current 0/10, worst 8/10, best 0/10   Location: left knee    Description: Sharp  Aggravating Factors: Standing, Walking, and Getting out of bed/chair  Easing Factors: rest    Patients goals: Improve ROM and strength     Medical History:   No past medical history on file.    Surgical History:   Cesario Griffith  has no past surgical history on file.    Medications:   Cesario has a current medication list which includes the following prescription(s): albuterol, clonazepam, famotidine, fluoxetine, ibuprofen, meloxicam, and multivitamin.    Allergies:   Review of patient's allergies indicates:   Allergen Reactions    Penicillins           OBJECTIVE     Posture: Decreased lumbar lordosis and forward head in standing.   Palpation: Moderate point tenderness noted with palpation of the medial and lateral joint lines   Sensation: Decreased along the incision, otherwise intact    Range of Motion/Strength:     Knee ROM Left Right Pain/Dysfunction with Movement   Knee flexion 72 degrees 122 degress    Knee extension -22 degrees -10 degrees        Knee MMT Left Right   Knee flexion 4/5 5/5   Knee extension 4-/5 5/5   Hip flexion 4/5 4+/5   Hip extension  4/5 4+/5   Hip abduction 4/5 4+/5   Hip external rotation  4/5 4+/5   Hip internal rotation  4+/5 4+/5       Gait Without AD   Analysis Patient ambulates with limitation of knee extension in stance phase. He displays decreased stance time on the right       Limitation/Restriction for FOTO  Survey    Therapist reviewed FOTO scores for Cesario Griffith on 7/9/2024.   FOTO documents entered into EPIC - see Media section.    Intake Score: 34%         TREATMENT     Total Treatment time (time-based codes) separate from " Evaluation: 30 minutes      Cesario received the treatments listed below:      THERAPEUTIC EXERCISES to develop strength, ROM, and flexibility for 30 minutes including :     NuStep Seat 14, Level 1, 10 minutes    Distal A/P femoral glides (grade II & III) with 4 inch bolster under heel and yoga block under proximal tibia  Long sitting Hamstring stretch 3 x 30 sec  Long sitting Gastroc-soleus stretch 3 x 30 sec  Heel prop 3 min using yoga block under heel  Prone hang x 3 min    Supine heel slides 3 x 10  Prone knee flexion stretch with strap 3 x 10 (3 sec hold)  Seated knee flexion 3 x 10   Knee flexion on stairs 3  x 10 (3 sec hold)    PATIENT EDUCATION AND HOME EXERCISES     Education provided:   - Need for daily work on ROM    Written Home Exercises Provided: yes. Exercises were reviewed and Cesario was able to demonstrate them prior to the end of the session.  Cesario demonstrated good  understanding of the education provided. See EMR under Patient Instructions for exercises provided during therapy sessions.    ASSESSMENT     Cesario is a 28 y.o. male referred to outpatient Physical Therapy with a medical diagnosis of Arthralgia of knee, left & Flexion contracture of knee, left . Patient presents with :    Left knee pain  Decreased left knee strength  Decreased left knee ROM  Decreased ability to perform ADL and limited tolerance to standing and walking.    Patient prognosis is Fair.   Patientt will benefit from skilled outpatient Physical Therapy to address the deficits stated above and in the chart below, provide patient /family education, and to maximize patientt's level of independence.     Plan of care discussed with patient: Yes  Patient's spiritual, cultural and educational needs considered and patient is agreeable to the plan of care and goals as stated below:     Anticipated Barriers for therapy: none    Medical Necessity is demonstrated by the following  History  Co-morbidities and personal factors that may  impact the plan of care [x] LOW: no personal factors / co-morbidities  [] MODERATE: 1-2 personal factors / co-morbidities  [] HIGH: 3+ personal factors / co-morbidities    Moderate / High Support Documentation:      Examination  Body Structures and Functions, activity limitations and participation restrictions that may impact the plan of care [x] LOW: addressing 1-2 elements  [] MODERATE: 3+ elements  [] HIGH: 4+ elements (please support below)    Moderate / High Support Documentation:      Clinical Presentation [x] LOW: stable  [] MODERATE: Evolving  [] HIGH: Unstable     Decision Making/ Complexity Score: low       Goals:      SHORT TERM GOALS:  4 weeks  Progress Date met   The patient will begin a written HEP [] Met  [] Not Met  [] Progressing     Increase knee ROM to -10* - 100* [] Met  [] Not Met  [] Progressing     Decrease soft tissue tenderness to mild [] Met  [] Not Met  [] Progressing        LONG TERM GOALS: 8 weeks  Progress Date met   The patient will be independent with a HEP for maintenance [] Met  [] Not Met  [] Progressing     Increase knee ROM to -5* to 115* [] Met  [] Not Met  [] Progressing     Increase knee strength to 4+/5 [] Met  [] Not Met  [] Progressing     The patient will ambulate on a community level without gait deviation . [] Met  [] Not Met  [] Progressing     Increase FOTO to 56%. [] Met  [] Not Met  [] Progressing           PLAN   Plan of care Certification: 7/9/2024 to 9/13/2024.    Outpatient Physical Therapy 2 times weekly for 8 weeks to include the following interventions: Manual Therapy, Neuromuscular Re-ed, Patient Education, Therapeutic Activities, and Therapeutic Exercise.     Duy Duffy, PT      I CERTIFY THE NEED FOR THESE SERVICES FURNISHED UNDER THIS PLAN OF TREATMENT AND WHILE UNDER MY CARE   Physician's comments:     Physician's Signature: ___________________________________________________

## 2024-07-16 ENCOUNTER — CLINICAL SUPPORT (OUTPATIENT)
Dept: REHABILITATION | Facility: HOSPITAL | Age: 29
End: 2024-07-16
Payer: MEDICAID

## 2024-07-16 DIAGNOSIS — M25.562 ARTHRALGIA OF KNEE, LEFT: ICD-10-CM

## 2024-07-16 DIAGNOSIS — M24.562 FLEXION CONTRACTURE OF KNEE, LEFT: ICD-10-CM

## 2024-07-16 DIAGNOSIS — R26.9 GAIT ABNORMALITY: ICD-10-CM

## 2024-07-16 DIAGNOSIS — R29.898 DECREASED STRENGTH OF LOWER EXTREMITY: ICD-10-CM

## 2024-07-16 DIAGNOSIS — M25.662 DECREASED RANGE OF MOTION (ROM) OF LEFT KNEE: Primary | ICD-10-CM

## 2024-07-16 PROCEDURE — 97110 THERAPEUTIC EXERCISES: CPT | Mod: PN | Performed by: PHYSICAL THERAPIST

## 2024-07-16 NOTE — PROGRESS NOTES
[facility] OUTPAT[facility] THERAPY AND [facility]LL[facility]   Physical Therapy Treatment Note      Name: Cesario Griffith  Clinic Number: [id number]    Therapy Diagnosis:   Encounter Diagnoses   Name Primary?    Decreased range of motion (ROM) of left knee Yes    Gait abnormality     Arthralgia of knee, left     Flexion contracture of knee, left     Decreased strength of lower extremity      Physician: Kael Oneill MD    Visit Date: 7/16/2024    Physician Orders: PT Eval and Treat   Medical Diagnosis from Referral: Arthralgia of knee, left & Flexion contracture of knee, left   Evaluation Date: 7/9/2024  Authorization Period Expiration: 9/14/2025  Plan of Care Expiration: 9/13/2024  Progress Note Due: 8/9/2024  Visit # / Visits authorized: 1/ 8  (POC: 2/16)   FOTO: 1/3       PTA Visit #: 0/5     Time In: 1400  Time Out: 1500  Total Billable Time: 60 minutes    Precautions: Standard and cancer  Insurance: Payor: MEDICAID / Plan: [employer] CONNECT / Product Type: Managed Medicaid /     Subjective     Pt reports: he has been working on his extension and flexion at home..  He was compliant with home exercise program.  Response to previous treatment: no complaints  Functional change: ongoing    Pain: 3/10  Location: left knee      Objective      Objective Measures updated at progress report unless specified.     Treatment     Cesario received the treatments listed below:      therapeutic exercises to develop ROM and flexibility for 60 minutes including:      Long sitting Hamstring stretch 3 x 30 sec  Long sitting Gastroc-soleus stretch 3 x 30 sec  Distal A/P femoral glides (grade II & III) with 4 inch bolster under heel and yoga block under proximal tibia  Heel prop 3 min using yoga block under heel  Prone hang x 3 min  Prone knee extension with distraction 4 min    Distracted knee flexion with strap  Supine heel slides 3 x 10  Prone knee flexion stretch with strap 3 x 10 (3 sec hold)  Seated knee flexion 3 x 10   Knee flexion on  stairs 3  x 10 (3 sec hold)          Future visits  Shuttle leg press 3 x 10 #  Shuttle single leg press 3 x 10 #  Shuttle calf press 3 x 10 #    Precor knee extension seat , 3 x 10 #   Precor Hamstring curl seat  3 x 10 #     Rower with  lower extremity on slider  min   NuStep Seat 14, Level 1, 10 minutes    Patient Education and Home Exercises       Education provided:   - Continue with HEP, Ice for pain management as needed    Written Home Exercises Provided: yes. Exercises were reviewed and Cesario was able to demonstrate them prior to the end of the session.  Cesario demonstrated good  understanding of the education provided. See EMR under Patient Instructions for exercises provided during therapy sessions    Assessment     The patient presents with continued limitation with extension and flexion. He tolerates manual techniques well. Extension improved to approximately equal to the right following manual.    Cesario Is progressing well towards his goals.   Pt prognosis is Good.     Pt will continue to benefit from skilled outpatient physical therapy to address the deficits listed in the problem list box on initial evaluation, provide pt/family education and to maximize pt's level of independence in the home and community environment.     Pt's spiritual, cultural and educational needs considered and pt agreeable to plan of care and goals.     Anticipated barriers to physical therapy: none    Goals:   SHORT TERM GOALS:  4 weeks  Progress Date met   The patient will begin a written HEP [] Met  [] Not Met  [x] Progressing     Increase knee ROM to -10* - 100* [] Met  [] Not Met  [x] Progressing     Decrease soft tissue tenderness to mild [] Met  [] Not Met  [x] Progressing        LONG TERM GOALS: 8 weeks  Progress Date met   The patient will be independent with a HEP for maintenance [] Met  [] Not Met  [x] Progressing     Increase knee ROM to -5* to 115* [] Met  [] Not Met  [x] Progressing     Increase knee strength to  4+/5 [] Met  [] Not Met  [x] Progressing     The patient will ambulate on a community level without gait deviation . [] Met  [] Not Met  [x] Progressing     Increase FOTO to 56%. [] Met  [] Not Met  [x] Progressing       Plan     Continue with physical therapy as per plan of care    Plan of care Certification: 7/9/2024 to 9/13/2024.     Outpatient Physical Therapy 2 times weekly for 8 weeks to include the following interventions: Manual Therapy, Neuromuscular Re-ed, Patient Education, Therapeutic Activities, and Therapeutic Exercise.        Duy Duffy, PT

## 2024-07-23 ENCOUNTER — CLINICAL SUPPORT (OUTPATIENT)
Dept: REHABILITATION | Facility: HOSPITAL | Age: 29
End: 2024-07-23
Payer: MEDICAID

## 2024-07-23 DIAGNOSIS — M25.562 ARTHRALGIA OF KNEE, LEFT: ICD-10-CM

## 2024-07-23 DIAGNOSIS — R29.898 DECREASED STRENGTH OF LOWER EXTREMITY: ICD-10-CM

## 2024-07-23 DIAGNOSIS — M25.662 DECREASED RANGE OF MOTION (ROM) OF LEFT KNEE: Primary | ICD-10-CM

## 2024-07-23 DIAGNOSIS — M24.562 FLEXION CONTRACTURE OF KNEE, LEFT: ICD-10-CM

## 2024-07-23 DIAGNOSIS — R26.9 GAIT ABNORMALITY: ICD-10-CM

## 2024-07-23 PROCEDURE — 97110 THERAPEUTIC EXERCISES: CPT | Mod: PN,CQ

## 2024-07-23 NOTE — PROGRESS NOTES
[facility]   Physical Therapy Treatment Note      Name: Cesario Griffith  Clinic Number: [id number]    Therapy Diagnosis:   Encounter Diagnoses   Name Primary?    Decreased range of motion (ROM) of left knee Yes    Gait abnormality     Arthralgia of knee, left     Flexion contracture of knee, left     Decreased strength of lower extremity      Physician: Kael Oneill MD    Visit Date: 7/23/2024    Physician Orders: PT Eval and Treat   Medical Diagnosis from Referral: Arthralgia of knee, left & Flexion contracture of knee, left   Evaluation Date: 7/9/2024  Authorization Period Expiration: 9/14/2025  Plan of Care Expiration: 9/13/2024  Progress Note Due: 8/9/2024  Visit # / Visits authorized: 2/ 8  (POC: 3/16)   FOTO: 1/3       PTA Visit #: 1/5     Time In: 1400  Time Out: 1500  Total Billable Time: 60 minutes    Precautions: Standard and cancer  Insurance: Payor: MEDICAID / Plan: [employer] / Product Type: Managed Medicaid /     Subjective     Pt reports: Had to miss last Thursday due to needing to go out of town for work. Is sore today as a result.     He was compliant with home exercise program.  Response to previous treatment: no complaints  Functional change: ongoing    Pain: 3/10  Location: left knee      Objective      Objective Measures updated at progress report unless specified.     Treatment     Cesario received the treatments listed below:      therapeutic exercises to develop ROM and flexibility for 60 minutes including:      Long sitting Hamstring stretch 3 x 30 sec  Long sitting Gastroc-soleus stretch 3 x 30 sec  Distal A/P femoral glides (grade II & III) with 4 inch bolster under heel and yoga block under proximal tibia  Heel prop 3 min using yoga block under heel  Prone hang x 3 min  Prone knee extension with distraction 4 min    Distracted knee flexion with strap  Supine heel slides 3 x 10  Prone knee flexion stretch with strap 3 x 10 (3 sec hold)  Seated  knee flexion 3 x 10   Knee flexion on stairs 3  x 10 (3 sec hold)    Standing quad sets leaning against the wall x 25 repetitions   Shuttle single leg press 3 x 10 25#  Donkey kicks 12 pounds 3 x 10 repetitions       Future visits  Shuttle leg press 3 x 10 #  Shuttle calf press 3 x 10 #    Precor knee extension seat , 3 x 10 #   Precor Hamstring curl seat  3 x 10 #     Rower with  lower extremity on slider  min   NuStep Seat 14, Level 1, 10 minutes    Patient Education and Home Exercises       Education provided:   - Continue with HEP, Ice for pain management as needed    Written Home Exercises Provided: yes. Exercises were reviewed and Cesario was able to demonstrate them prior to the end of the session.  Cesario demonstrated good  understanding of the education provided. See EMR under Patient Instructions for exercises provided during therapy sessions    Assessment     Cesario notes better quad activation in closed chain activities vs open chain. He was able to progress strengthening as indicated above. Will continue to progress as able.     Cesario Is progressing well towards his goals.   Pt prognosis is Good.     Pt will continue to benefit from skilled outpatient physical therapy to address the deficits listed in the problem list box on initial evaluation, provide pt/family education and to maximize pt's level of independence in the home and community environment.     Pt's spiritual, cultural and educational needs considered and pt agreeable to plan of care and goals.     Anticipated barriers to physical therapy: none    Goals:   SHORT TERM GOALS:  4 weeks  Progress Date met   The patient will begin a written HEP [] Met  [] Not Met  [x] Progressing     Increase knee ROM to -10* - 100* [] Met  [] Not Met  [x] Progressing     Decrease soft tissue tenderness to mild [] Met  [] Not Met  [x] Progressing        LONG TERM GOALS: 8 weeks  Progress Date met   The patient will be independent with a HEP for maintenance []  Met  [] Not Met  [x] Progressing     Increase knee ROM to -5* to 115* [] Met  [] Not Met  [x] Progressing     Increase knee strength to 4+/5 [] Met  [] Not Met  [x] Progressing     The patient will ambulate on a community level without gait deviation . [] Met  [] Not Met  [x] Progressing     Increase FOTO to 56%. [] Met  [] Not Met  [x] Progressing       Plan     Continue with physical therapy as per plan of care    Plan of care Certification: 7/9/2024 to 9/13/2024.     Outpatient Physical Therapy 2 times weekly for 8 weeks to include the following interventions: Manual Therapy, Neuromuscular Re-ed, Patient Education, Therapeutic Activities, and Therapeutic Exercise.        Emy Lambert, PTA

## 2024-10-21 DIAGNOSIS — G83.14 MONOPLEGIA OF LOWER LIMB AFFECTING LEFT NONDOMINANT SIDE: Primary | ICD-10-CM
